# Patient Record
Sex: MALE | Race: WHITE | Employment: FULL TIME | URBAN - METROPOLITAN AREA
[De-identification: names, ages, dates, MRNs, and addresses within clinical notes are randomized per-mention and may not be internally consistent; named-entity substitution may affect disease eponyms.]

---

## 2017-02-09 ENCOUNTER — TELEPHONE (OUTPATIENT)
Dept: CARDIOLOGY CLINIC | Age: 62
End: 2017-02-09

## 2017-02-09 NOTE — TELEPHONE ENCOUNTER
Per patient denied any extensive bruising and confirmed he's been on Plavix for 1 year and he's asking if he needs to continue taking

## 2017-02-10 NOTE — TELEPHONE ENCOUNTER
Can use warm compresses if hematoma or  extensive area of bruising  .  Needs to be confirm with Dr. Vincent Coronado

## 2017-02-21 ENCOUNTER — OFFICE VISIT (OUTPATIENT)
Dept: CARDIOLOGY CLINIC | Age: 62
End: 2017-02-21

## 2017-02-21 VITALS
HEART RATE: 63 BPM | WEIGHT: 220 LBS | DIASTOLIC BLOOD PRESSURE: 81 MMHG | BODY MASS INDEX: 31.5 KG/M2 | SYSTOLIC BLOOD PRESSURE: 146 MMHG | HEIGHT: 70 IN

## 2017-02-21 DIAGNOSIS — I10 ESSENTIAL HYPERTENSION: ICD-10-CM

## 2017-02-21 DIAGNOSIS — E78.5 DYSLIPIDEMIA, GOAL LDL BELOW 70: ICD-10-CM

## 2017-02-21 DIAGNOSIS — I25.10 CORONARY ARTERY DISEASE INVOLVING NATIVE CORONARY ARTERY OF NATIVE HEART WITHOUT ANGINA PECTORIS: Primary | ICD-10-CM

## 2017-02-21 DIAGNOSIS — Z95.5 S/P RIGHT CORONARY ARTERY (RCA) STENT PLACEMENT: ICD-10-CM

## 2017-02-21 RX ORDER — ATORVASTATIN CALCIUM 40 MG/1
40 TABLET, FILM COATED ORAL
Qty: 30 TAB | Refills: 6 | Status: CANCELLED | OUTPATIENT
Start: 2017-02-21

## 2017-02-21 NOTE — MR AVS SNAPSHOT
Visit Information Date & Time Provider Department Dept. Phone Encounter #  
 2/21/2017  9:30 AM Thompson Hilton MD [facility] [place] [id number] Follow-up Instructions Return in about 6 months (around 8/21/2017). Your Appointments 8/24/2017  1:00 PM  
Follow Up with Thompson Hilton MD  
[facility]) Appt Note: 6 month follow up [address] [facility], [address], [address] [place] [address] [place] Upcoming Health Maintenance Date Due Hepatitis C Screening 1955 DTaP/Tdap/Td series (1 - Tdap) 5/19/1978 FOBT Q 1 YEAR AGE 50-75 5/19/2007 INFLUENZA AGE 9 TO ADULT 8/1/2016 Allergies as of 2/21/2017  Review Complete On: 2/21/2017 By: Yousuf Sal Severity Noted Reaction Type Reactions Celebrex [Celecoxib]  06/09/2014    Other (comments) Have hiatal hernia, bleeding concerns Current Immunizations  Never Reviewed No immunizations on file. Not reviewed this visit Vitals BP Pulse Height(growth percentile) Weight(growth percentile) BMI Smoking Status 146/81 63 5' 10\" (1.778 m) 220 lb (99.8 kg) 31.57 kg/m2 Never Smoker Vitals History BMI and BSA Data Body Mass Index Body Surface Area  
 31.57 kg/m 2 2.22 m 2 Preferred Pharmacy [place] [id number] [facility] Your Updated Medication List  
  
   
This list is accurate as of: 2/21/17 10:52 AM.  Always use your most recent med list. amLODIPine 5 mg tablet Commonly known as:  [place] Blade Take 1 Tab by mouth daily. aspirin 81 mg chewable tablet Take 1 Tab by mouth daily. atorvastatin 40 mg tablet Commonly known as:  LIPITOR Take 1 Tab by mouth nightly. metoprolol tartrate 25 mg tablet Commonly known as:  LOPRESSOR  
 Take 1 Tab by mouth every twelve (12) hours. multivitamin tablet Commonly known as:  ONE A DAY Take 1 Tab by mouth daily. NexIUM 40 mg capsule Generic drug:  esomeprazole Take 40 mg by mouth daily. Follow-up Instructions Return in about 6 months (around 8/21/2017). Patient Instructions Learning About Coronary Artery Disease (CAD) What is coronary artery disease? Coronary artery disease (CAD) occurs when plaque builds up in the arteries that bring oxygen-rich blood to your heart. Plaque is a fatty substance made of cholesterol, calcium, and other substances in the blood. This process is called hardening of the arteries, or atherosclerosis. What happens when you have coronary artery disease? · Plaque may narrow the coronary arteries. Narrowed arteries cause poor blood flow. This can lead to angina symptoms such as chest pain or discomfort. If blood flow is completely blocked, you could have a heart attack. · You can slow CAD and reduce the risk of future problems by making changes in your lifestyle. These include quitting smoking and eating heart-healthy foods. · Treatments for CAD, along with changes in your lifestyle, can help you live a longer and healthier life. How can you prevent coronary artery disease? · Do not smoke. It may be the best thing you can do to prevent heart disease. If you need help quitting, talk to your doctor about stop-smoking programs and medicines. These can increase your chances of quitting for good. · Be active. Get at least 30 minutes of exercise on most days of the week. Walking is a good choice. You also may want to do other activities, such as running, swimming, cycling, or playing tennis or team sports. · Eat heart-healthy foods. Eat more fruits and vegetables and less foods that contain saturated and trans fats. Limit alcohol, sodium, and sweets. · Stay at a healthy weight. Lose weight if you need to. · Manage other health problems such as diabetes, high blood pressure, and high cholesterol. · Manage stress. Stress can hurt your heart. To keep stress low, talk about your problems and feelings. Don't keep your feelings hidden. · If you have talked about it with your doctor, take a low-dose aspirin every day. Aspirin can help certain people lower their risk of a heart attack or stroke. But taking aspirin isn't right for everyone, because it can cause serious bleeding. Do not start taking daily aspirin unless your doctor knows about it. How is coronary artery disease treated? · Your doctor will suggest that you make lifestyle changes. For example, your doctor may ask you to eat healthy foods, quit smoking, lose extra weight, and be more active. · You will have to take medicines. · Your doctor may suggest a procedure to open narrowed or blocked arteries. This is called angioplasty. Or your doctor may suggest using healthy blood vessels to create detours around narrowed or blocked arteries. This is called bypass surgery. Follow-up care is a key part of your treatment and safety. Be sure to make and go to all appointments, and call your doctor if you are having problems. It's also a good idea to know your test results and keep a list of the medicines you take. Where can you learn more? Go to http://cain-jagdeep.info/. Enter (35) 6171 8629 in the search box to learn more about \"Learning About Coronary Artery Disease (CAD). \" Current as of: January 27, 2016 Content Version: 11.1 © 6832-7594 [employer]. Care instructions adapted under license by [employer] (which disclaims liability or warranty for this information). If you have questions about a medical condition or this instruction, always ask your healthcare professional. [place] [address] any warranty or liability for your use of this information. Introducing [facility]! Dear Shahida Head: Thank you for requesting a Offerboxx account. Our records indicate that you already have an active Offerboxx account. You can access your account anytime at [email or web address] Did you know that you can access your hospital and ER discharge instructions at any time in Offerboxx? You can also review all of your test results from your hospital stay or ER visit. Additional Information If you have questions, please visit the Frequently Asked Questions section of the Offerboxx website at [email or web address] Remember, Offerboxx is NOT to be used for urgent needs. For medical emergencies, dial 911. Now available from your iPhone and Android! Please provide this summary of care documentation to your next provider. Your primary care clinician is listed as MARIA ELENA NAZARIO. If you have any questions after today's visit, please call [phone].

## 2017-02-21 NOTE — PATIENT INSTRUCTIONS
Learning About Coronary Artery Disease (CAD)  What is coronary artery disease? Coronary artery disease (CAD) occurs when plaque builds up in the arteries that bring oxygen-rich blood to your heart. Plaque is a fatty substance made of cholesterol, calcium, and other substances in the blood. This process is called hardening of the arteries, or atherosclerosis. What happens when you have coronary artery disease? · Plaque may narrow the coronary arteries. Narrowed arteries cause poor blood flow. This can lead to angina symptoms such as chest pain or discomfort. If blood flow is completely blocked, you could have a heart attack. · You can slow CAD and reduce the risk of future problems by making changes in your lifestyle. These include quitting smoking and eating heart-healthy foods. · Treatments for CAD, along with changes in your lifestyle, can help you live a longer and healthier life. How can you prevent coronary artery disease? · Do not smoke. It may be the best thing you can do to prevent heart disease. If you need help quitting, talk to your doctor about stop-smoking programs and medicines. These can increase your chances of quitting for good. · Be active. Get at least 30 minutes of exercise on most days of the week. Walking is a good choice. You also may want to do other activities, such as running, swimming, cycling, or playing tennis or team sports. · Eat heart-healthy foods. Eat more fruits and vegetables and less foods that contain saturated and trans fats. Limit alcohol, sodium, and sweets. · Stay at a healthy weight. Lose weight if you need to. · Manage other health problems such as diabetes, high blood pressure, and high cholesterol. · Manage stress. Stress can hurt your heart. To keep stress low, talk about your problems and feelings. Don't keep your feelings hidden. · If you have talked about it with your doctor, take a low-dose aspirin every day.  Aspirin can help certain people lower their risk of a heart attack or stroke. But taking aspirin isn't right for everyone, because it can cause serious bleeding. Do not start taking daily aspirin unless your doctor knows about it. How is coronary artery disease treated? · Your doctor will suggest that you make lifestyle changes. For example, your doctor may ask you to eat healthy foods, quit smoking, lose extra weight, and be more active. · You will have to take medicines. · Your doctor may suggest a procedure to open narrowed or blocked arteries. This is called angioplasty. Or your doctor may suggest using healthy blood vessels to create detours around narrowed or blocked arteries. This is called bypass surgery. Follow-up care is a key part of your treatment and safety. Be sure to make and go to all appointments, and call your doctor if you are having problems. It's also a good idea to know your test results and keep a list of the medicines you take. Where can you learn more? Go to http://cain-jagdeep.info/. Enter ([phone] 6372 4415 in the search box to learn more about \"Learning About Coronary Artery Disease (CAD). \"  Current as of: January 27, 2016  Content Version: 11.1  © 6174-5527 [employer]. Care instructions adapted under license by [employer] (which disclaims liability or warranty for this information). If you have questions about a medical condition or this instruction, always ask your healthcare professional. [place] [address] any warranty or liability for your use of this information.

## 2017-02-21 NOTE — PROGRESS NOTES
1. Have you been to the ER, urgent care clinic since your last visit? Hospitalized since your last visit? No    2. Have you seen or consulted any other health care providers outside of the [facility] since your last visit? Include any pap smears or colon screening. Yes Where: Dr Vanegas/PCP     3. Since your last visit, have you had any of the following symptoms? chest pains, palpitations, shortness of breath, dizziness and swelling in legs/arms. 4.  Have you had any blood work, X-rays or cardiac testing? No              5.  Where do you normally have your labs drawn? [facility]    6. Do you need any refills today?    No

## 2017-02-27 RX ORDER — ATORVASTATIN CALCIUM 40 MG/1
40 TABLET, FILM COATED ORAL
Qty: 30 TAB | Refills: 6 | Status: SHIPPED | OUTPATIENT
Start: 2017-02-27 | End: 2017-09-07

## 2017-02-28 NOTE — PROGRESS NOTES
HISTORY OF PRESENT ILLNESS  Denisse Rodriguez III is a 63 y.o. male. Shortness of Breath   The history is provided by the patient. This is a chronic problem. The problem occurs rarely. The current episode started more than 1 week ago. The problem has not changed since onset. Pertinent negatives include no fever, no cough, no sputum production, no hemoptysis, no PND, no orthopnea, no vomiting, no leg pain and no claudication. Associated medical issues include CAD and past MI. Associated medical issues do not include heart failure. Chest Pain (Angina)    The history is provided by the patient. This is a new problem. The problem has been resolved. Associated symptoms include shortness of breath. Pertinent negatives include no claudication, no cough, no diaphoresis, no dizziness, no fever, no hemoptysis, no leg pain, no nausea, no near-syncope, no orthopnea, no palpitations, no PND, no sputum production, no vomiting and no weakness. Risk factors include hypertension, male gender and dyslipidemia. His past medical history is significant for HTN. His past medical history does not include DM. Procedural history includes cardiac catheterization, echocardiogram and cardiac stents. Review of Systems   Constitutional: Negative for diaphoresis and fever. Respiratory: Positive for shortness of breath. Negative for cough, hemoptysis and sputum production. Cardiovascular: Negative for palpitations, orthopnea, claudication, PND and near-syncope. Gastrointestinal: Negative for nausea and vomiting. Neurological: Negative for dizziness and weakness.      Family History   Problem Relation Age of Onset    Colon Polyps Mother     Stroke Father     Hypertension Father     Cancer Brother        Past Medical History:   Diagnosis Date    Acid reflux     Failed total hip arthroplasty ([facility] [address] 6/8/2014    GERD (gastroesophageal reflux disease)     Hypertension 2007    Ill-defined condition 12/2015    Echo EF 50%    OA (osteoarthritis)     Osteoarthritis of right hip 6/8/2014    Other ill-defined conditions(799.89)     hiatia hernia    Psychiatric disorder     S/P coronary artery stent placement     STEMI (ST elevation myocardial infarction) ([facility] [address] 12/02/2015    RCA stent       Past Surgical History:   Procedure Laterality Date    HX HIP REPLACEMENT      left hip replacement    HX KNEE ARTHROSCOPY  6-04-12    right knee replacemtnt    HX OTHER SURGICAL      bilateral hip replacement    HX TONSILLECTOMY         Social History   Substance Use Topics    Smoking status: Never Smoker    Smokeless tobacco: Never Used    Alcohol use 0.0 oz/week     6 - 12 Cans of beer per week       Allergies   Allergen Reactions    Celebrex [Celecoxib] Other (comments)     Have hiatal hernia, bleeding concerns       Outpatient Prescriptions Marked as Taking for the 2/21/17 encounter (Office Visit) with Agustín Perez MD   Medication Sig Dispense Refill    amLODIPine (NORVASC) 5 mg tablet Take 1 Tab by mouth daily. 30 Tab 6    [DISCONTINUED] atorvastatin (LIPITOR) 40 mg tablet Take 1 Tab by mouth nightly. 30 Tab 6    metoprolol tartrate (LOPRESSOR) 25 mg tablet Take 1 Tab by mouth every twelve (12) hours. 180 Tab 3    aspirin 81 mg chewable tablet Take 1 Tab by mouth daily. 30 Tab 6    multivitamin (ONE A DAY) tablet Take 1 Tab by mouth daily.  esomeprazole (NEXIUM) 40 mg capsule Take 40 mg by mouth daily. Visit Vitals    /81    Pulse 63    Ht 5' 10\" (1.778 m)    Wt 99.8 kg (220 lb)    BMI 31.57 kg/m2     Physical Exam   Constitutional: He is oriented to person, place, and time. He appears well-developed and well-nourished. No distress. HENT:   Head: Atraumatic. Mouth/Throat: No oropharyngeal exudate. Eyes: Conjunctivae are normal. No scleral icterus. Neck: Neck supple. No JVD present. Cardiovascular: Normal rate and regular rhythm. Exam reveals no gallop. No murmur heard.   Pulmonary/Chest: Effort normal and breath sounds normal. No stridor. He has no wheezes. He has no rales. Abdominal: Soft. There is no tenderness. There is no rebound. Musculoskeletal: Normal range of motion. He exhibits no edema or tenderness. Neurological: He is alert and oriented to person, place, and time. He exhibits normal muscle tone. Skin: Skin is warm. He is not diaphoretic. Psychiatric: He has a normal mood and affect. His behavior is normal.       ASSESSMENT and PLAN    ICD-10-CM ICD-9-CM    1. Coronary artery disease involving native coronary artery of native heart without angina pectoris I25.10 414.01    2. Essential hypertension I10 401.9    3. Dyslipidemia, goal LDL below 70 E78.5 272.4    4. S/P right coronary artery (RCA) stent placement Z95.5 V45.82      No orders of the defined types were placed in this encounter. Follow-up Disposition:  Return in about 6 months (around 8/21/2017). current treatment plan is effective, no change in therapy  cardiovascular risk and specific lipid/LDL goals reviewed  use of aspirin to prevent MI and TIA's discussed. Patient with recent inferior wall MI- s/p RCA stent- also has residual moderate to severe LAd stenosis- stress test done failed to reveal any ischemia. plavix discontinued due to extensive brusing  Continue intense risk factor modification. Continue intense risk factor modification.

## 2017-05-24 RX ORDER — AMLODIPINE BESYLATE 5 MG/1
5 TABLET ORAL DAILY
Qty: 30 TAB | Refills: 6 | Status: SHIPPED | OUTPATIENT
Start: 2017-05-24 | End: 2018-02-04 | Stop reason: SDUPTHER

## 2017-08-15 ENCOUNTER — OFFICE VISIT (OUTPATIENT)
Dept: CARDIOLOGY CLINIC | Age: 62
End: 2017-08-15

## 2017-08-15 VITALS
HEIGHT: 70 IN | DIASTOLIC BLOOD PRESSURE: 88 MMHG | WEIGHT: 226 LBS | SYSTOLIC BLOOD PRESSURE: 140 MMHG | BODY MASS INDEX: 32.35 KG/M2 | HEART RATE: 83 BPM

## 2017-08-15 DIAGNOSIS — I25.10 CORONARY ARTERY DISEASE INVOLVING NATIVE CORONARY ARTERY OF NATIVE HEART WITHOUT ANGINA PECTORIS: Primary | ICD-10-CM

## 2017-08-15 DIAGNOSIS — K44.9 PARAESOPHAGEAL HERNIA: ICD-10-CM

## 2017-08-15 DIAGNOSIS — Z95.5 S/P RIGHT CORONARY ARTERY (RCA) STENT PLACEMENT: ICD-10-CM

## 2017-08-15 DIAGNOSIS — E78.5 DYSLIPIDEMIA, GOAL LDL BELOW 70: ICD-10-CM

## 2017-08-15 DIAGNOSIS — R07.9 CHEST PAIN, UNSPECIFIED TYPE: ICD-10-CM

## 2017-08-15 DIAGNOSIS — I10 ESSENTIAL HYPERTENSION: ICD-10-CM

## 2017-08-15 RX ORDER — ROSUVASTATIN CALCIUM 20 MG/1
40 TABLET, COATED ORAL
COMMUNITY

## 2017-08-15 RX ORDER — METOPROLOL TARTRATE 25 MG/1
25 TABLET, FILM COATED ORAL EVERY 12 HOURS
Qty: 180 TAB | Refills: 3 | Status: CANCELLED | OUTPATIENT
Start: 2017-08-15

## 2017-08-15 NOTE — PROGRESS NOTES
HISTORY OF PRESENT ILLNESS  Alexander Manzanares III is a 63 y.o. male. Shortness of Breath   The history is provided by the patient. This is a chronic problem. The problem occurs rarely. The current episode started more than 1 week ago. The problem has not changed since onset. Pertinent negatives include no fever, no cough, no sputum production, no hemoptysis, no PND, no orthopnea, no vomiting, no leg pain and no claudication. Associated medical issues include CAD and past MI. Associated medical issues do not include heart failure. Chest Pain (Angina)    The history is provided by the patient. This is a new problem. The problem has been resolved. Pertinent negatives include no claudication, no cough, no diaphoresis, no dizziness, no fever, no hemoptysis, no leg pain, no nausea, no near-syncope, no orthopnea, no palpitations, no PND, no sputum production, no vomiting and no weakness. Risk factors include hypertension, male gender and dyslipidemia. His past medical history is significant for HTN. His past medical history does not include DM. Procedural history includes cardiac catheterization, echocardiogram and cardiac stents. Review of Systems   Constitutional: Negative for diaphoresis and fever. Respiratory: Negative for cough, hemoptysis and sputum production. Cardiovascular: Negative for palpitations, orthopnea, claudication, PND and near-syncope. Gastrointestinal: Negative for nausea and vomiting. Neurological: Negative for dizziness and weakness.      Family History   Problem Relation Age of Onset    Colon Polyps Mother     Stroke Father     Hypertension Father     Cancer Brother        Past Medical History:   Diagnosis Date    Acid reflux     Failed total hip arthroplasty ([facility] [address] 6/8/2014    GERD (gastroesophageal reflux disease)     Hypertension 2007    Ill-defined condition 12/2015    Echo EF 50%    OA (osteoarthritis)     Osteoarthritis of right hip 6/8/2014    Other ill-defined conditions     hiatia hernia    Psychiatric disorder     S/P coronary artery stent placement     STEMI (ST elevation myocardial infarction) ([facility] [address] 12/02/2015    RCA stent       Past Surgical History:   Procedure Laterality Date    HX HIP REPLACEMENT      left hip replacement    HX KNEE ARTHROSCOPY  6-04-12    right knee replacemtnt    HX OTHER SURGICAL      bilateral hip replacement    HX TONSILLECTOMY         Social History   Substance Use Topics    Smoking status: Never Smoker    Smokeless tobacco: Never Used    Alcohol use 0.0 oz/week     6 - 12 Cans of beer per week       Allergies   Allergen Reactions    Celebrex [Celecoxib] Other (comments)     Have hiatal hernia, bleeding concerns       Outpatient Prescriptions Marked as Taking for the 8/15/17 encounter (Office Visit) with Carlos Recio MD   Medication Sig Dispense Refill    rosuvastatin (CRESTOR) 20 mg tablet Take 20 mg by mouth nightly.  amLODIPine (NORVASC) 5 mg tablet Take 1 Tab by mouth daily. 30 Tab 6    metoprolol tartrate (LOPRESSOR) 25 mg tablet Take 1 Tab by mouth every twelve (12) hours. 180 Tab 3    aspirin 81 mg chewable tablet Take 1 Tab by mouth daily. 30 Tab 6        Visit Vitals    /88    Pulse 83    Ht 5' 10\" (1.778 m)    Wt 102.5 kg (226 lb)    BMI 32.43 kg/m2     Physical Exam   Constitutional: He is oriented to person, place, and time. He appears well-developed and well-nourished. No distress. HENT:   Head: Atraumatic. Mouth/Throat: No oropharyngeal exudate. Eyes: Conjunctivae are normal. No scleral icterus. Neck: Neck supple. No JVD present. Cardiovascular: Normal rate and regular rhythm. Exam reveals no gallop. No murmur heard. Pulmonary/Chest: Effort normal and breath sounds normal. No stridor. He has no wheezes. He has no rales. Abdominal: Soft. There is no tenderness. There is no rebound. Musculoskeletal: Normal range of motion. He exhibits no edema or tenderness. Neurological: He is alert and oriented to person, place, and time. He exhibits normal muscle tone. Skin: Skin is warm. He is not diaphoretic. Psychiatric: He has a normal mood and affect. His behavior is normal.       ASSESSMENT and PLAN    ICD-10-CM ICD-9-CM    1. Coronary artery disease involving native coronary artery of native heart without angina pectoris I25.10 414.01    2. Essential hypertension I10 401.9    3. Chest pain, unspecified type R07.9 786.50 AMB POC EKG ROUTINE W/ 12 LEADS, INTER & REP   4. Dyslipidemia, goal LDL below 70 E78.5 272.4    5. S/P right coronary artery (RCA) stent placement Z95.5 V45.82    6. Paraesophageal hernia K44.9 553.3      Orders Placed This Encounter    AMB POC EKG ROUTINE W/ 12 LEADS, INTER & REP     Order Specific Question:   Reason for Exam:     Answer:   surgicial clearance     Follow-up Disposition:  Return in about 6 months (around 2/15/2018). current treatment plan is effective, no change in therapy  cardiovascular risk and specific lipid/LDL goals reviewed  use of aspirin to prevent MI and TIA's discussed. Patient with recent inferior wall MI- s/p RCA stent- also has residual moderate to severe LAd stenosis- stress test done failed to reveal any ischemia. plavix discontinued due to extensive brusing  Seen for pre op evaluation prior to paraesophageal hernia surgery. His ET is good and lipid well controlled. Can proceed to planned surgery with low to intermediate cardiac risk. If needed, okay to hold aspirin for 3-5 days prior to surgery and resume post op ASAP.

## 2017-08-15 NOTE — MR AVS SNAPSHOT
Visit Information Date & Time Provider Department Dept. Phone Encounter #  
 8/15/2017 12:45 PM Leslie Smith MD [facility] [address] [place] [address] [id number] Follow-up Instructions Return in about 6 months (around 2/15/2018). Your Appointments 2/20/2018 12:15 PM  
Office Visit with Leslie Smith MD  
[facility]) Appt Note:   
 [address] [facility], [address] Upcoming Health Maintenance Date Due Hepatitis C Screening 1955 DTaP/Tdap/Td series (1 - Tdap) 5/19/1978 FOBT Q 1 YEAR AGE 50-75 5/19/2007 ZOSTER VACCINE AGE 60> 3/19/2017 INFLUENZA AGE 9 TO ADULT 8/1/2017 Allergies as of 8/15/2017  Review Complete On: 8/15/2017 By: Cosme Arguello LPN Severity Noted Reaction Type Reactions Celebrex [Celecoxib]  06/09/2014    Other (comments) Have hiatal hernia, bleeding concerns Current Immunizations  Never Reviewed No immunizations on file. Not reviewed this visit You Were Diagnosed With   
  
 Codes Comments Coronary artery disease involving native coronary artery of native heart without angina pectoris    -  Primary ICD-10-CM: I25.10 ICD-9-CM: 414.01 Essential hypertension     ICD-10-CM: I10 
ICD-9-CM: 401.9 Chest pain, unspecified type     ICD-10-CM: R07.9 ICD-9-CM: 786.50 Dyslipidemia, goal LDL below 70     ICD-10-CM: E78.5 ICD-9-CM: 272.4 S/P right coronary artery (RCA) stent placement     ICD-10-CM: Z95.5 ICD-9-CM: V45.82 Paraesophageal hernia     ICD-10-CM: K44.9 ICD-9-CM: 755. 3 Vitals BP Pulse Height(growth percentile) Weight(growth percentile) BMI Smoking Status 140/88 83 5' 10\" (1.778 m) 226 lb (102.5 kg) 32.43 kg/m2 Never Smoker Vitals History BMI and BSA Data Body Mass Index Body Surface Area 32.43 kg/m 2 2.25 m 2 Preferred Pharmacy Pharmacy Name Phone [facility] Your Updated Medication List  
  
   
This list is accurate as of: 8/15/17  1:32 PM.  Always use your most recent med list. amLODIPine 5 mg tablet Commonly known as:  Patrick Bailey Take 1 Tab by mouth daily. aspirin 81 mg chewable tablet Take 1 Tab by mouth daily. atorvastatin 40 mg tablet Commonly known as:  LIPITOR Take 1 Tab by mouth nightly. CRESTOR 20 mg tablet Generic drug:  rosuvastatin Take 20 mg by mouth nightly. metoprolol tartrate 25 mg tablet Commonly known as:  LOPRESSOR Take 1 Tab by mouth every twelve (12) hours. multivitamin tablet Commonly known as:  ONE A DAY Take 1 Tab by mouth daily. NexIUM 40 mg capsule Generic drug:  esomeprazole Take 40 mg by mouth daily. We Performed the Following AMB POC EKG ROUTINE W/ 12 LEADS, INTER & REP [05555 CPT(R)] Follow-up Instructions Return in about 6 months (around 2/15/2018). To-Do List   
 09/08/2017 7:00 AM  
  Appointment with [address] at [address] [place] [address] ([phone]) Introducing [facility]! Dear Antoni Acevedo: 
Thank you for requesting a [employer] account. Our records indicate that you already have an active [employer] account. You can access your account anytime at [email or web address] Did you know that you can access your hospital and ER discharge instructions at any time in [employer]? You can also review all of your test results from your hospital stay or ER visit. Additional Information If you have questions, please visit the Frequently Asked Questions section of the [employer] website at [email or web address] Remember, [employer] is NOT to be used for urgent needs. For medical emergencies, dial 911. Now available from your iPhone and Android! Please provide this summary of care documentation to your next provider. Your primary care clinician is listed as MARIA ELENA NAZARIO. If you have any questions after today's visit, please call [phone].

## 2017-08-15 NOTE — LETTER
8/15/2017 Dr Cronin Cargo [address] [address] Dear Samira Broderick: 
 
Re: Farzana Orellana  
: 1955 Mr. Yue Luevano is cleared from a cardiac standpoint with mild risk for Hernia surgery scheduled on 17. If you have any questions or any further assistance is needed please contact our office.  
 
Sincerely, 
 
 
 
 
 
Simon Serna MD

## 2017-08-15 NOTE — PROGRESS NOTES
1. Have you been to the ER, urgent care clinic since your last visit? Hospitalized since your last visit?    no    2. Have you seen or consulted any other health care providers outside of the [address] [place] [address] since your last visit? Include any pap smears or colon screening. Yes,pcp    3. Since your last visit, have you had any of the following symptoms? Little chest pain and sob d/t hernia, little swelling          4. Have you had any blood work, X-rays or cardiac testing? Yes, [facility] in [facility]            5.  Where do you normally have your labs drawn? [facility] in [facility]    6. Do you need any refills today?

## 2017-09-08 PROBLEM — K44.9 HIATAL HERNIA: Status: ACTIVE | Noted: 2017-09-08

## 2017-10-04 RX ORDER — METOPROLOL TARTRATE 25 MG/1
TABLET, FILM COATED ORAL
Qty: 180 TAB | Refills: 0 | Status: SHIPPED | OUTPATIENT
Start: 2017-10-04 | End: 2018-04-04 | Stop reason: SDUPTHER

## 2018-04-04 RX ORDER — METOPROLOL TARTRATE 25 MG/1
TABLET, FILM COATED ORAL
Qty: 180 TAB | Refills: 0 | Status: SHIPPED | OUTPATIENT
Start: 2018-04-04 | End: 2018-08-20 | Stop reason: SDUPTHER

## 2018-05-15 LAB
LDL-C, EXTERNAL: 76
LDL-C, EXTERNAL: 76

## 2018-06-06 ENCOUNTER — OFFICE VISIT (OUTPATIENT)
Dept: CARDIOLOGY CLINIC | Age: 63
End: 2018-06-06

## 2018-06-06 VITALS
WEIGHT: 228 LBS | BODY MASS INDEX: 33.77 KG/M2 | DIASTOLIC BLOOD PRESSURE: 90 MMHG | HEIGHT: 69 IN | SYSTOLIC BLOOD PRESSURE: 151 MMHG | HEART RATE: 65 BPM

## 2018-06-06 DIAGNOSIS — E78.5 DYSLIPIDEMIA, GOAL LDL BELOW 70: ICD-10-CM

## 2018-06-06 DIAGNOSIS — I10 ESSENTIAL HYPERTENSION: ICD-10-CM

## 2018-06-06 DIAGNOSIS — I25.118 CORONARY ARTERY DISEASE OF NATIVE ARTERY OF NATIVE HEART WITH STABLE ANGINA PECTORIS (HCC): Primary | ICD-10-CM

## 2018-06-06 DIAGNOSIS — Z95.5 S/P RIGHT CORONARY ARTERY (RCA) STENT PLACEMENT: ICD-10-CM

## 2018-06-06 RX ORDER — TAMSULOSIN HYDROCHLORIDE 0.4 MG/1
0.4 CAPSULE ORAL DAILY
COMMUNITY

## 2018-06-06 RX ORDER — ISOSORBIDE MONONITRATE 30 MG/1
30 TABLET, EXTENDED RELEASE ORAL
Qty: 30 TAB | Refills: 4 | Status: SHIPPED | OUTPATIENT
Start: 2018-06-06 | End: 2022-08-24

## 2018-06-06 RX ORDER — AMOXICILLIN 875 MG/1
875 TABLET, FILM COATED ORAL 2 TIMES DAILY
COMMUNITY
End: 2018-08-03 | Stop reason: ALTCHOICE

## 2018-06-06 NOTE — MR AVS SNAPSHOT
[address] [facility] [place] [address] 706 [facility] 
[phone] Patient: Jackson Urrutia 
MRN: [id number] Visit Information Date & Time Provider Department Dept. Phone Encounter #  
 6/6/2018 11:45 AM Kannan Lassiter MD [facility] [place]  Follow-up Instructions Return in about 2 months (around 8/6/2018). Your Appointments 8/7/2018  9:30 AM  
Office Visit with Kannan Lassiter MD  
[facility]) Appt Note: 2 month follow up [address] [facility], [address] [facility] [address] 4058 [facility], [address] [place] [address] Upcoming Health Maintenance Date Due Hepatitis C Screening 1955 DTaP/Tdap/Td series (1 - Tdap) 5/19/1978 FOBT Q 1 YEAR AGE 50-75 5/19/2007 ZOSTER VACCINE AGE 60> 3/19/2017 Influenza Age 5 to Adult 8/1/2018 Allergies as of 6/6/2018  Review Complete On: 9/8/2017 By: Jeane Harden RN Severity Noted Reaction Type Reactions Celebrex [Celecoxib]  06/09/2014    Other (comments) Have hiatal hernia, bleeding concerns Current Immunizations  Never Reviewed No immunizations on file. Not reviewed this visit Vitals BP Pulse Height(growth percentile) Weight(growth percentile) BMI Smoking Status 151/90 65 5' 9\" (1.753 m) 228 lb (103.4 kg) 33.67 kg/m2 Never Smoker Vitals History BMI and BSA Data Body Mass Index Body Surface Area  
 33.67 kg/m 2 2.24 m 2 Preferred Pharmacy Pharmacy Name Phone [address], [facility] [address] [facility] Your Updated Medication List  
  
   
This list is accurate as of 6/6/18 12:24 PM.  Always use your most recent med list. amLODIPine 5 mg tablet Commonly known as:  Georgette Payne TAKE ONE TABLET BY MOUTH DAILY amoxicillin 875 mg tablet Commonly known as:  AMOXIL Take 875 mg by mouth two (2) times a day. aspirin 81 mg chewable tablet Take 1 Tab by mouth daily. CRESTOR 20 mg tablet Generic drug:  rosuvastatin Take 20 mg by mouth nightly. GINKGO BILOBA PO Take  by mouth.  
  
 isosorbide mononitrate ER 30 mg tablet Commonly known as:  IMDUR Take 1 Tab by mouth every morning. metoprolol tartrate 25 mg tablet Commonly known as:  LOPRESSOR  
TAKE 1 TABLET BY MOUTH EVERY 12 HOURS  
  
 multivitamin tablet Commonly known as:  ONE A DAY Take 1 Tab by mouth daily. NexIUM 40 mg capsule Generic drug:  esomeprazole Take 40 mg by mouth as needed. tamsulosin 0.4 mg capsule Commonly known as:  FLOMAX Take 0.4 mg by mouth daily. Prescriptions Sent to Pharmacy Refills  
 isosorbide mononitrate ER (IMDUR) 30 mg tablet 4 Sig: Take 1 Tab by mouth every morning. Class: Normal  
 Pharmacy: [address] #: [phone] Route: Oral  
  
Follow-up Instructions Return in about 2 months (around 8/6/2018). Patient Instructions High Blood Pressure: Care Instructions Your Care Instructions If your blood pressure is usually above 140/90, you have high blood pressure, or hypertension. That means the top number is 140 or higher or the bottom number is 90 or higher, or both. Despite what a lot of people think, high blood pressure usually doesn't cause headaches or make you feel dizzy or lightheaded. It usually has no symptoms. But it does increase your risk for heart attack, stroke, and kidney or eye damage. The higher your blood pressure, the more your risk increases. Your doctor will give you a goal for your blood pressure. Your goal will be based on your health and your age. An example of a goal is to keep your blood pressure below 140/90. Lifestyle changes, such as eating healthy and being active, are always important to help lower blood pressure. You might also take medicine to reach your blood pressure goal. 
Follow-up care is a key part of your treatment and safety. Be sure to make and go to all appointments, and call your doctor if you are having problems. It's also a good idea to know your test results and keep a list of the medicines you take. How can you care for yourself at home? Medical treatment · If you stop taking your medicine, your blood pressure will go back up. You may take one or more types of medicine to lower your blood pressure. Be safe with medicines. Take your medicine exactly as prescribed. Call your doctor if you think you are having a problem with your medicine. · Talk to your doctor before you start taking aspirin every day. Aspirin can help certain people lower their risk of a heart attack or stroke. But taking aspirin isn't right for everyone, because it can cause serious bleeding. · See your doctor regularly. You may need to see the doctor more often at first or until your blood pressure comes down. · If you are taking blood pressure medicine, talk to your doctor before you take decongestants or anti-inflammatory medicine, such as ibuprofen. Some of these medicines can raise blood pressure. · Learn how to check your blood pressure at home. Lifestyle changes · Stay at a healthy weight. This is especially important if you put on weight around the waist. Losing even 10 pounds can help you lower your blood pressure. · If your doctor recommends it, get more exercise. Walking is a good choice. Bit by bit, increase the amount you walk every day. Try for at least 30 minutes on most days of the week. You also may want to swim, bike, or do other activities. · Avoid or limit alcohol. Talk to your doctor about whether you can drink any alcohol.  
· Try to limit how much sodium you eat to less than 2,300 milligrams (mg) a day. Your doctor may ask you to try to eat less than 1,500 mg a day. · Eat plenty of fruits (such as bananas and oranges), vegetables, legumes, whole grains, and low-fat dairy products. · Lower the amount of saturated fat in your diet. Saturated fat is found in animal products such as milk, cheese, and meat. Limiting these foods may help you lose weight and also lower your risk for heart disease. · Do not smoke. Smoking increases your risk for heart attack and stroke. If you need help quitting, talk to your doctor about stop-smoking programs and medicines. These can increase your chances of quitting for good. When should you call for help? Call 911 anytime you think you may need emergency care. This may mean having symptoms that suggest that your blood pressure is causing a serious heart or blood vessel problem. Your blood pressure may be over 180/110. ? For example, call 911 if: 
? · You have symptoms of a heart attack. These may include: ¨ Chest pain or pressure, or a strange feeling in the chest. 
¨ Sweating. ¨ Shortness of breath. ¨ Nausea or vomiting. ¨ Pain, pressure, or a strange feeling in the back, neck, jaw, or upper belly or in one or both shoulders or arms. ¨ Lightheadedness or sudden weakness. ¨ A fast or irregular heartbeat. ? · You have symptoms of a stroke. These may include: 
¨ Sudden numbness, tingling, weakness, or loss of movement in your face, arm, or leg, especially on only one side of your body. ¨ Sudden vision changes. ¨ Sudden trouble speaking. ¨ Sudden confusion or trouble understanding simple statements. ¨ Sudden problems with walking or balance. ¨ A sudden, severe headache that is different from past headaches. ? · You have severe back or belly pain. ?Do not wait until your blood pressure comes down on its own. Get help right away. ?Call your doctor now or seek immediate care if: 
? · Your blood pressure is much higher than normal (such as 180/110 or higher), but you don't have symptoms. ? · You think high blood pressure is causing symptoms, such as: ¨ Severe headache. ¨ Blurry vision. ? Watch closely for changes in your health, and be sure to contact your doctor if: 
? · Your blood pressure measures 140/90 or higher at least 2 times. That means the top number is 140 or higher or the bottom number is 90 or higher, or both. ? · You think you may be having side effects from your blood pressure medicine. ? · Your blood pressure is usually normal, but it goes above normal at least 2 times. Where can you learn more? Go to http://cain-jagdeep.info/. Enter S328 in the search box to learn more about \"High Blood Pressure: Care Instructions. \" Current as of: September 21, 2016 Content Version: 11.4 © 3323-5775 [employer]. Care instructions adapted under license by [employer] (which disclaims liability or warranty for this information). If you have questions about a medical condition or this instruction, always ask your healthcare professional. [place] [address] any warranty or liability for your use of this information. Introducing [facility]! Dear Luzma Jones: 
Thank you for requesting a [employer] account. Our records indicate that you already have an active [employer] account. You can access your account anytime at [email or web address] Did you know that you can access your hospital and ER discharge instructions at any time in [employer]? You can also review all of your test results from your hospital stay or ER visit. Additional Information If you have questions, please visit the Frequently Asked Questions section of the [employer] website at [email or web address] Remember, [employer] is NOT to be used for urgent needs. For medical emergencies, dial 911. Now available from your iPhone and Android! Please provide this summary of care documentation to your next provider. Your primary care clinician is listed as MARIA ELENA NAZARIO. If you have any questions after today's visit, please call [phone].

## 2018-06-06 NOTE — PROGRESS NOTES
1. Have you been to the ER, urgent care clinic since your last visit? Hospitalized since your last visit?     no    2. Have you seen or consulted any other health care providers outside of the [facility] since your last visit? Include any pap smears or colon screening. Yes Where: pcp     3. Since your last visit, have you had any of the following symptoms? chest pains and shortness of breath.

## 2018-06-06 NOTE — PATIENT INSTRUCTIONS
High Blood Pressure: Care Instructions  Your Care Instructions    If your blood pressure is usually above 140/90, you have high blood pressure, or hypertension. That means the top number is 140 or higher or the bottom number is 90 or higher, or both. Despite what a lot of people think, high blood pressure usually doesn't cause headaches or make you feel dizzy or lightheaded. It usually has no symptoms. But it does increase your risk for heart attack, stroke, and kidney or eye damage. The higher your blood pressure, the more your risk increases. Your doctor will give you a goal for your blood pressure. Your goal will be based on your health and your age. An example of a goal is to keep your blood pressure below 140/90. Lifestyle changes, such as eating healthy and being active, are always important to help lower blood pressure. You might also take medicine to reach your blood pressure goal.  Follow-up care is a key part of your treatment and safety. Be sure to make and go to all appointments, and call your doctor if you are having problems. It's also a good idea to know your test results and keep a list of the medicines you take. How can you care for yourself at home? Medical treatment  · If you stop taking your medicine, your blood pressure will go back up. You may take one or more types of medicine to lower your blood pressure. Be safe with medicines. Take your medicine exactly as prescribed. Call your doctor if you think you are having a problem with your medicine. · Talk to your doctor before you start taking aspirin every day. Aspirin can help certain people lower their risk of a heart attack or stroke. But taking aspirin isn't right for everyone, because it can cause serious bleeding. · See your doctor regularly. You may need to see the doctor more often at first or until your blood pressure comes down.   · If you are taking blood pressure medicine, talk to your doctor before you take decongestants or anti-inflammatory medicine, such as ibuprofen. Some of these medicines can raise blood pressure. · Learn how to check your blood pressure at home. Lifestyle changes  · Stay at a healthy weight. This is especially important if you put on weight around the waist. Losing even 10 pounds can help you lower your blood pressure. · If your doctor recommends it, get more exercise. Walking is a good choice. Bit by bit, increase the amount you walk every day. Try for at least 30 minutes on most days of the week. You also may want to swim, bike, or do other activities. · Avoid or limit alcohol. Talk to your doctor about whether you can drink any alcohol. · Try to limit how much sodium you eat to less than 2,300 milligrams (mg) a day. Your doctor may ask you to try to eat less than 1,500 mg a day. · Eat plenty of fruits (such as bananas and oranges), vegetables, legumes, whole grains, and low-fat dairy products. · Lower the amount of saturated fat in your diet. Saturated fat is found in animal products such as milk, cheese, and meat. Limiting these foods may help you lose weight and also lower your risk for heart disease. · Do not smoke. Smoking increases your risk for heart attack and stroke. If you need help quitting, talk to your doctor about stop-smoking programs and medicines. These can increase your chances of quitting for good. When should you call for help? Call 911 anytime you think you may need emergency care. This may mean having symptoms that suggest that your blood pressure is causing a serious heart or blood vessel problem. Your blood pressure may be over 180/110. ? For example, call 911 if:  ? · You have symptoms of a heart attack. These may include:  ¨ Chest pain or pressure, or a strange feeling in the chest.  ¨ Sweating. ¨ Shortness of breath. ¨ Nausea or vomiting.   ¨ Pain, pressure, or a strange feeling in the back, neck, jaw, or upper belly or in one or both shoulders or arms.  ¨ Lightheadedness or sudden weakness. ¨ A fast or irregular heartbeat. ? · You have symptoms of a stroke. These may include:  ¨ Sudden numbness, tingling, weakness, or loss of movement in your face, arm, or leg, especially on only one side of your body. ¨ Sudden vision changes. ¨ Sudden trouble speaking. ¨ Sudden confusion or trouble understanding simple statements. ¨ Sudden problems with walking or balance. ¨ A sudden, severe headache that is different from past headaches. ? · You have severe back or belly pain. ?Do not wait until your blood pressure comes down on its own. Get help right away. ?Call your doctor now or seek immediate care if:  ? · Your blood pressure is much higher than normal (such as 180/110 or higher), but you don't have symptoms. ? · You think high blood pressure is causing symptoms, such as:  ¨ Severe headache. ¨ Blurry vision. ? Watch closely for changes in your health, and be sure to contact your doctor if:  ? · Your blood pressure measures 140/90 or higher at least 2 times. That means the top number is 140 or higher or the bottom number is 90 or higher, or both. ? · You think you may be having side effects from your blood pressure medicine. ? · Your blood pressure is usually normal, but it goes above normal at least 2 times. Where can you learn more? Go to http://cain-jagdeep.info/. Enter M341 in the search box to learn more about \"High Blood Pressure: Care Instructions. \"  Current as of: September 21, 2016  Content Version: 11.4  © 2143-0438 [employer]. Care instructions adapted under license by [employer] (which disclaims liability or warranty for this information). If you have questions about a medical condition or this instruction, always ask your healthcare professional. [place] [address] any warranty or liability for your use of this information.

## 2018-06-06 NOTE — LETTER
2018 1:17 PM 
 
Mr. Grant Mares  
[address] [place] [address] Dear Dr. Tiesha Man: 
 
Re: Sang Pastrana  
: 1955 Mr. Zion Banegas is cleared from a cardiac standpoint with low risk for colonoscopy. If you have any questions or any further assistance is needed please contact our office. Sincerely, Signed By: Sofi Koenig MD  
 2018 Sofi Koenig MD 
 
cc:  Vanessa Hernandez MD

## 2018-06-06 NOTE — PROGRESS NOTES
HISTORY OF PRESENT ILLNESS  Giuliano Anderson III is a 66 y.o. male. Shortness of Breath   The history is provided by the patient. This is a chronic problem. The problem occurs rarely. The current episode started more than 1 week ago. The problem has not changed since onset. Associated symptoms include chest pain. Pertinent negatives include no fever, no cough, no sputum production, no hemoptysis, no PND, no orthopnea, no vomiting, no leg pain and no claudication. Associated medical issues include CAD and past MI. Associated medical issues do not include heart failure. Chest Pain (Angina)    The history is provided by the patient. This is a new problem. The problem occurs every several days. Pertinent negatives include no claudication, no cough, no diaphoresis, no dizziness, no fever, no hemoptysis, no leg pain, no nausea, no near-syncope, no orthopnea, no palpitations, no PND, no sputum production, no vomiting and no weakness. Risk factors include hypertension, male gender and dyslipidemia. His past medical history is significant for HTN. His past medical history does not include DM. Procedural history includes cardiac catheterization, echocardiogram and cardiac stents. Review of Systems   Constitutional: Negative for diaphoresis and fever. Respiratory: Negative for cough, hemoptysis and sputum production. Cardiovascular: Positive for chest pain. Negative for palpitations, orthopnea, claudication, PND and near-syncope. Gastrointestinal: Negative for nausea and vomiting. Neurological: Negative for dizziness and weakness.      Family History   Problem Relation Age of Onset    Colon Polyps Mother     Stroke Father     Hypertension Father     Cancer Brother        Past Medical History:   Diagnosis Date    Acid reflux     Failed total hip arthroplasty ([facility] [address] 6/8/2014    GERD (gastroesophageal reflux disease)     Hypertension 2007    Ill-defined condition 12/2015    Echo EF 50%    OA (osteoarthritis)  Osteoarthritis of right hip 6/8/2014    Other ill-defined conditions(799.89)     hiatia hernia    Psychiatric disorder     S/P coronary artery stent placement     STEMI (ST elevation myocardial infarction) ([facility] [address] 12/02/2015    RCA stent       Past Surgical History:   Procedure Laterality Date    HX CORONARY STENT PLACEMENT Right 2015    right coronary     HX HIP REPLACEMENT      left hip replacement about 18 yrs. ago.  HX KNEE REPLACEMENT  6-04-12    right knee replacemtnt    HX OTHER SURGICAL      bilateral hip replacement    HX TONSILLECTOMY         Social History   Substance Use Topics    Smoking status: Never Smoker    Smokeless tobacco: Never Used    Alcohol use 0.0 oz/week     6 - 12 Cans of beer per week       Allergies   Allergen Reactions    Celebrex [Celecoxib] Other (comments)     Have hiatal hernia, bleeding concerns       Outpatient Prescriptions Marked as Taking for the 6/6/18 encounter (Office Visit) with Leslie Smith MD   Medication Sig Dispense Refill    tamsulosin (FLOMAX) 0.4 mg capsule Take 0.4 mg by mouth daily.  GINKGO BILOBA PO Take  by mouth.  amoxicillin (AMOXIL) 875 mg tablet Take 875 mg by mouth two (2) times a day.  isosorbide mononitrate ER (IMDUR) 30 mg tablet Take 1 Tab by mouth every morning. 30 Tab 4    metoprolol tartrate (LOPRESSOR) 25 mg tablet TAKE 1 TABLET BY MOUTH EVERY 12 HOURS 180 Tab 0    amLODIPine (NORVASC) 5 mg tablet TAKE ONE TABLET BY MOUTH DAILY 30 Tab 3    rosuvastatin (CRESTOR) 20 mg tablet Take 20 mg by mouth nightly.  aspirin 81 mg chewable tablet Take 1 Tab by mouth daily. 30 Tab 6    multivitamin (ONE A DAY) tablet Take 1 Tab by mouth daily.  esomeprazole (NEXIUM) 40 mg capsule Take 40 mg by mouth as needed. Visit Vitals    /90    Pulse 65    Ht 5' 9\" (1.753 m)    Wt 103.4 kg (228 lb)    BMI 33.67 kg/m2     Physical Exam   Constitutional: He is oriented to person, place, and time.  He appears well-developed and well-nourished. No distress. HENT:   Head: Atraumatic. Mouth/Throat: No oropharyngeal exudate. Eyes: Conjunctivae are normal. No scleral icterus. Neck: Neck supple. No JVD present. Cardiovascular: Normal rate and regular rhythm. Exam reveals no gallop. No murmur heard. Pulmonary/Chest: Effort normal and breath sounds normal. No stridor. He has no wheezes. He has no rales. Abdominal: Soft. There is no tenderness. There is no rebound. Musculoskeletal: Normal range of motion. He exhibits no edema or tenderness. Neurological: He is alert and oriented to person, place, and time. He exhibits normal muscle tone. Skin: Skin is warm. He is not diaphoretic. Psychiatric: He has a normal mood and affect. His behavior is normal.       ASSESSMENT and PLAN    ICD-10-CM ICD-9-CM    1. Coronary artery disease of native artery of native heart with stable angina pectoris ([facility] [address] I25.118 414.01      413.9    2. Essential hypertension I10 401.9    3. Dyslipidemia, goal LDL below 70 E78.5 272.4    4. S/P right coronary artery (RCA) stent placement Z95.5 V45.82      Orders Placed This Encounter    isosorbide mononitrate ER (IMDUR) 30 mg tablet     Sig: Take 1 Tab by mouth every morning. Dispense:  30 Tab     Refill:  4     Follow-up Disposition:  Return in about 2 months (around 8/6/2018). current treatment plan is effective, no change in therapy  cardiovascular risk and specific lipid/LDL goals reviewed  use of aspirin to prevent MI and TIA's discussed. Patient with recent inferior wall MI- s/p RCA stent- also has residual moderate to severe LAd stenosis- stress test done failed to reveal any ischemia. plavix discontinued due to extensive brusing    He underwent recent surgery for paraesophageal hernia-- symptoms improving but not resolved. ? Mild stable angina-- will add imdur 30mg daily  He is scheduled for colonoscopy-- can proceed with low cardiac risk.   If symptoms worsen, will consider cardiac w/u in the next clinic visit.

## 2018-07-10 RX ORDER — AMLODIPINE BESYLATE 5 MG/1
TABLET ORAL
Qty: 30 TAB | Refills: 0 | Status: SHIPPED | OUTPATIENT
Start: 2018-07-10 | End: 2018-12-28 | Stop reason: SDUPTHER

## 2018-08-03 ENCOUNTER — OFFICE VISIT (OUTPATIENT)
Dept: CARDIOLOGY CLINIC | Age: 63
End: 2018-08-03

## 2018-08-03 VITALS
DIASTOLIC BLOOD PRESSURE: 81 MMHG | BODY MASS INDEX: 34.07 KG/M2 | SYSTOLIC BLOOD PRESSURE: 144 MMHG | WEIGHT: 230 LBS | HEIGHT: 69 IN

## 2018-08-03 DIAGNOSIS — E78.5 DYSLIPIDEMIA, GOAL LDL BELOW 70: ICD-10-CM

## 2018-08-03 DIAGNOSIS — Z95.5 S/P RIGHT CORONARY ARTERY (RCA) STENT PLACEMENT: ICD-10-CM

## 2018-08-03 DIAGNOSIS — I10 ESSENTIAL HYPERTENSION: ICD-10-CM

## 2018-08-03 DIAGNOSIS — I25.118 CORONARY ARTERY DISEASE OF NATIVE ARTERY OF NATIVE HEART WITH STABLE ANGINA PECTORIS (HCC): Primary | ICD-10-CM

## 2018-08-03 DIAGNOSIS — K44.9 HIATAL HERNIA: ICD-10-CM

## 2018-08-03 NOTE — PROGRESS NOTES
HISTORY OF PRESENT ILLNESS  Angely Maier III is a 66 y.o. male. Shortness of Breath   The history is provided by the patient. This is a chronic problem. The problem occurs rarely. The current episode started more than 1 week ago. The problem has not changed since onset. Associated symptoms include chest pain. Pertinent negatives include no fever, no cough, no sputum production, no hemoptysis, no PND, no orthopnea, no vomiting, no leg pain and no claudication. Associated medical issues include CAD and past MI. Associated medical issues do not include heart failure. Chest Pain (Angina)    The history is provided by the patient. This is a new problem. The problem occurs every several days. Associated symptoms include shortness of breath. Pertinent negatives include no claudication, no cough, no diaphoresis, no dizziness, no fever, no hemoptysis, no leg pain, no nausea, no near-syncope, no orthopnea, no palpitations, no PND, no sputum production, no vomiting and no weakness. Risk factors include hypertension, male gender and dyslipidemia. His past medical history is significant for HTN. His past medical history does not include DM. Procedural history includes cardiac catheterization, echocardiogram and cardiac stents. Review of Systems   Constitutional: Negative for diaphoresis and fever. Respiratory: Positive for shortness of breath. Negative for cough, hemoptysis and sputum production. Cardiovascular: Positive for chest pain. Negative for palpitations, orthopnea, claudication, PND and near-syncope. Gastrointestinal: Negative for nausea and vomiting. Neurological: Negative for dizziness and weakness.      Family History   Problem Relation Age of Onset    Colon Polyps Mother     Stroke Father     Hypertension Father     Cancer Brother        Past Medical History:   Diagnosis Date    Acid reflux     [facility]    GERD (gastroesophageal reflux disease)     Hypertension 2007    Ill-defined condition 12/2015    Echo EF 50%    OA (osteoarthritis)     Osteoarthritis of right hip 6/8/2014    Other ill-defined conditions(799.89)     hiatia hernia    Psychiatric disorder     S/P coronary artery stent placement     STEMI (ST elevation myocardial infarction) ([facility] [address] 12/02/2015    RCA stent       Past Surgical History:   Procedure Laterality Date    HX CORONARY STENT PLACEMENT Right 2015    right coronary     HX HIP REPLACEMENT      left hip replacement about 18 yrs. ago.  HX KNEE REPLACEMENT  6-04-12    right knee replacemtnt    HX OTHER SURGICAL      bilateral hip replacement    HX TONSILLECTOMY         Social History   Substance Use Topics    Smoking status: Never Smoker    Smokeless tobacco: Never Used    Alcohol use 0.0 oz/week     6 - 12 Cans of beer per week       Allergies   Allergen Reactions    Celebrex [Celecoxib] Other (comments)     Have hiatal hernia, bleeding concerns       Outpatient Prescriptions Marked as Taking for the 8/3/18 encounter (Office Visit) with Jesus Duarte MD   Medication Sig Dispense Refill    amLODIPine (NORVASC) 5 mg tablet TAKE ONE TABLET BY MOUTH DAILY 30 Tab 0    tamsulosin (FLOMAX) 0.4 mg capsule Take 0.4 mg by mouth daily.  GINKGO BILOBA PO Take  by mouth.  isosorbide mononitrate ER (IMDUR) 30 mg tablet Take 1 Tab by mouth every morning. 30 Tab 4    metoprolol tartrate (LOPRESSOR) 25 mg tablet TAKE 1 TABLET BY MOUTH EVERY 12 HOURS 180 Tab 0    rosuvastatin (CRESTOR) 20 mg tablet Take 20 mg by mouth nightly.  aspirin 81 mg chewable tablet Take 1 Tab by mouth daily. 30 Tab 6    multivitamin (ONE A DAY) tablet Take 1 Tab by mouth daily.  esomeprazole (NEXIUM) 40 mg capsule Take 40 mg by mouth as needed. Visit Vitals    /81    Ht 5' 9\" (1.753 m)    Wt 104.3 kg (230 lb)    BMI 33.97 kg/m2     Physical Exam   Constitutional: He is oriented to person, place, and time.  He appears well-developed and well-nourished. No distress. HENT:   Head: Atraumatic. Mouth/Throat: No oropharyngeal exudate. Eyes: Conjunctivae are normal. No scleral icterus. Neck: Neck supple. No JVD present. Cardiovascular: Normal rate and regular rhythm. Exam reveals no gallop. No murmur heard. Pulmonary/Chest: Effort normal and breath sounds normal. No stridor. He has no wheezes. He has no rales. Abdominal: Soft. There is no tenderness. There is no rebound. Musculoskeletal: Normal range of motion. He exhibits no edema or tenderness. Neurological: He is alert and oriented to person, place, and time. He exhibits normal muscle tone. Skin: Skin is warm. He is not diaphoretic. Psychiatric: He has a normal mood and affect. His behavior is normal.       ASSESSMENT and PLAN    ICD-10-CM ICD-9-CM    1. Coronary artery disease of native artery of native heart with stable angina pectoris ([facility] [address] I25.118 414.01      413.9    2. Essential hypertension I10 401.9    3. Dyslipidemia, goal LDL below 70 E78.5 272.4    4. S/P right coronary artery (RCA) stent placement Z95.5 V45.82    5. Hiatal hernia K44.9 553.3      No orders of the defined types were placed in this encounter. Follow-up Disposition:  Return in about 4 months (around 12/3/2018). current treatment plan is effective, no change in therapy  cardiovascular risk and specific lipid/LDL goals reviewed  use of aspirin to prevent MI and TIA's discussed. Patient with recent inferior wall MI- s/p RCA stent- also has residual moderate to severe LAd stenosis- stress test done failed to reveal any ischemia. plavix discontinued due to extensive brusing    He underwent recent surgery for paraesophageal hernia-- symptoms improving but not resolved. ? Mild stable angina-- on imdur 30mg daily  Had recent colonoscopy/ EGD with no significant findings.    Discussed at length regarding his meds and stable angina-- patient prefers medical management and will f/u in 4 months  Will obtain lipid panel from his PCP.

## 2018-08-03 NOTE — MR AVS SNAPSHOT
[address], [facility] [address] [phone] Patient: Mily Metzger 
MRN: [id number] Visit Information Date & Time Provider Department Dept. Phone Encounter #  
 8/3/2018 10:45 AM Luanne Cuellar MD [facility] [address] [place] [address] [id number] Follow-up Instructions Return in about 4 months (around 12/3/2018). Upcoming Health Maintenance Date Due DTaP/Tdap/Td series (1 - Tdap) 5/19/1978 FOBT Q 1 YEAR AGE 50-75 5/19/2007 ZOSTER VACCINE AGE 60> 3/19/2017 Influenza Age 5 to Adult 8/1/2018 Allergies as of 8/3/2018  Review Complete On: 8/3/2018 By: Nava Pang LPN Severity Noted Reaction Type Reactions Celebrex [Celecoxib]  06/09/2014    Other (comments) Have hiatal hernia, bleeding concerns Current Immunizations  Never Reviewed No immunizations on file. Not reviewed this visit You Were Diagnosed With   
  
 Codes Comments Coronary artery disease of native artery of native heart with stable angina pectoris ([facility] [address]    -  Primary ICD-10-CM: I25.118 
ICD-9-CM: 414.01, 413.9 Essential hypertension     ICD-10-CM: I10 
ICD-9-CM: 401.9 Dyslipidemia, goal LDL below 70     ICD-10-CM: E78.5 ICD-9-CM: 272.4 S/P right coronary artery (RCA) stent placement     ICD-10-CM: Z95.5 ICD-9-CM: V45.82 Hiatal hernia     ICD-10-CM: K44.9 ICD-9-CM: 712. 3 Vitals BP Height(growth percentile) Weight(growth percentile) BMI Smoking Status 144/81 5' 9\" (1.753 m) 230 lb (104.3 kg) 33.97 kg/m2 Never Smoker BMI and BSA Data Body Mass Index Body Surface Area  
 33.97 kg/m 2 2.25 m 2 Preferred Pharmacy Pharmacy Name Phone Meaghan Duque, [facility] [address] Your Updated Medication List  
  
   
This list is accurate as of 8/3/18 11:19 AM.  Always use your most recent med list. amLODIPine 5 mg tablet Commonly known as:  Karine Gonzales TAKE ONE TABLET BY MOUTH DAILY  
  
 aspirin 81 mg chewable tablet Take 1 Tab by mouth daily. CRESTOR 20 mg tablet Generic drug:  rosuvastatin Take 20 mg by mouth nightly. GINKGO BILOBA PO Take  by mouth.  
  
 isosorbide mononitrate ER 30 mg tablet Commonly known as:  IMDUR Take 1 Tab by mouth every morning. metoprolol tartrate 25 mg tablet Commonly known as:  LOPRESSOR  
TAKE 1 TABLET BY MOUTH EVERY 12 HOURS  
  
 multivitamin tablet Commonly known as:  ONE A DAY Take 1 Tab by mouth daily. NexIUM 40 mg capsule Generic drug:  esomeprazole Take 40 mg by mouth as needed. tamsulosin 0.4 mg capsule Commonly known as:  FLOMAX Take 0.4 mg by mouth daily. Follow-up Instructions Return in about 4 months (around 12/3/2018). Introducing [facility]! Dear Gurpreet Nieves: 
Thank you for requesting a [employer] account. Our records indicate that you already have an active [employer] account. You can access your account anytime at [email or web address] Did you know that you can access your hospital and ER discharge instructions at any time in [employer]? You can also review all of your test results from your hospital stay or ER visit. Additional Information If you have questions, please visit the Frequently Asked Questions section of the [employer] website at [email or web address] Remember, [employer] is NOT to be used for urgent needs. For medical emergencies, dial 911. Now available from your iPhone and Android! Please provide this summary of care documentation to your next provider. Your primary care clinician is listed as MARIA ELENA NAZARIO. If you have any questions after today's visit, please call [phone].

## 2018-09-12 NOTE — PROGRESS NOTES
1. Have you been to the ER, urgent care clinic since your last visit? Hospitalized since your last visit?     no    2. Have you seen or consulted any other health care providers outside of the [address] [place] [address] since your last visit? Include any pap smears or colon screening. Yes pcp    3. Since your last visit, have you had any of the following symptoms? No cardiac symptoms         4. Have you had any blood work, X-rays or cardiac testing? Yes pcp             5.  Where do you normally have your labs drawn?   pcp    6. Do you need any refills today?    no 12-Sep-2018 00:00

## 2019-02-19 ENCOUNTER — OFFICE VISIT (OUTPATIENT)
Dept: CARDIOLOGY CLINIC | Age: 64
End: 2019-02-19

## 2019-02-19 VITALS
BODY MASS INDEX: 33.77 KG/M2 | DIASTOLIC BLOOD PRESSURE: 74 MMHG | HEIGHT: 69 IN | HEART RATE: 61 BPM | WEIGHT: 228 LBS | SYSTOLIC BLOOD PRESSURE: 126 MMHG

## 2019-02-19 DIAGNOSIS — E78.5 DYSLIPIDEMIA, GOAL LDL BELOW 70: ICD-10-CM

## 2019-02-19 DIAGNOSIS — K44.9 HIATAL HERNIA: ICD-10-CM

## 2019-02-19 DIAGNOSIS — Z95.5 S/P RIGHT CORONARY ARTERY (RCA) STENT PLACEMENT: ICD-10-CM

## 2019-02-19 DIAGNOSIS — I25.118 CORONARY ARTERY DISEASE OF NATIVE ARTERY OF NATIVE HEART WITH STABLE ANGINA PECTORIS (HCC): ICD-10-CM

## 2019-02-19 DIAGNOSIS — I10 ESSENTIAL HYPERTENSION: Primary | ICD-10-CM

## 2019-02-19 NOTE — PROGRESS NOTES
Patient didn't bring medications, verbally reviewed    1. Have you been to the ER, urgent care clinic since your last visit? Hospitalized since your last visit? No    2. Have you seen or consulted any other health care providers outside of the [address] [place] [address] since your last visit? Include any pap smears or colon screening.  No

## 2019-02-19 NOTE — PROGRESS NOTES
HISTORY OF PRESENT ILLNESS  Anabel Barclay III is a 66 y.o. male. Shortness of Breath   The history is provided by the patient. This is a chronic problem. The problem occurs rarely. The current episode started more than 1 week ago. The problem has not changed since onset. Associated symptoms include chest pain. Pertinent negatives include no fever, no cough, no sputum production, no hemoptysis, no PND, no orthopnea, no vomiting, no leg pain and no claudication. Associated medical issues include CAD and past MI. Associated medical issues do not include heart failure. Chest Pain (Angina)    The history is provided by the patient. This is a new problem. The problem occurs every several days. Associated symptoms include shortness of breath. Pertinent negatives include no claudication, no cough, no diaphoresis, no dizziness, no fever, no hemoptysis, no leg pain, no nausea, no near-syncope, no orthopnea, no palpitations, no PND, no sputum production, no vomiting and no weakness. Risk factors include hypertension, male gender and dyslipidemia. His past medical history is significant for HTN. His past medical history does not include DM. Procedural history includes cardiac catheterization, echocardiogram and cardiac stents. Review of Systems   Constitutional: Negative for diaphoresis and fever. Respiratory: Positive for shortness of breath. Negative for cough, hemoptysis and sputum production. Cardiovascular: Positive for chest pain. Negative for palpitations, orthopnea, claudication, PND and near-syncope. Gastrointestinal: Negative for nausea and vomiting. Neurological: Negative for dizziness and weakness.      Family History   Problem Relation Age of Onset    Colon Polyps Mother     Stroke Father     Hypertension Father     Cancer Brother        Past Medical History:   Diagnosis Date    Acid reflux     [facility]    GERD (gastroesophageal reflux disease)     Hypertension 2007    Ill-defined condition 12/2015    Echo EF 50%    OA (osteoarthritis)     Osteoarthritis of right hip 6/8/2014    Other ill-defined conditions(799.89)     hiatia hernia    Psychiatric disorder     S/P coronary artery stent placement     STEMI (ST elevation myocardial infarction) ([facility] [address] 12/02/2015    RCA stent       Past Surgical History:   Procedure Laterality Date    HX CORONARY STENT PLACEMENT Right 2015    right coronary     HX HIP REPLACEMENT      left hip replacement about 18 yrs. ago.  HX KNEE REPLACEMENT  6-04-12    right knee replacemtnt    HX OTHER SURGICAL      bilateral hip replacement    HX TONSILLECTOMY         Social History     Tobacco Use    Smoking status: Never Smoker    Smokeless tobacco: Never Used   Substance Use Topics    Alcohol use: Yes     Alcohol/week: 0.0 oz     Types: 6 - 12 Cans of beer per week     Frequency: 2-3 times a week     Drinks per session: 5 or 6     Binge frequency: Weekly       Allergies   Allergen Reactions    Celebrex [Celecoxib] Other (comments)     Have hiatal hernia, bleeding concerns       Outpatient Medications Marked as Taking for the 2/19/19 encounter (Office Visit) with Unknown MD Renato   Medication Sig Dispense Refill    amLODIPine (NORVASC) 5 mg tablet TAKE ONE TABLET BY MOUTH DAILY 30 Tab 5    metoprolol tartrate (LOPRESSOR) 25 mg tablet TAKE 1 TABLET BY MOUTH EVERY 12 HOURS 180 Tab 1    tamsulosin (FLOMAX) 0.4 mg capsule Take 0.4 mg by mouth daily.  GINKGO BILOBA PO Take  by mouth.  rosuvastatin (CRESTOR) 20 mg tablet Take 20 mg by mouth nightly.  aspirin 81 mg chewable tablet Take 1 Tab by mouth daily. 30 Tab 6    multivitamin (ONE A DAY) tablet Take 1 Tab by mouth daily.  esomeprazole (NEXIUM) 40 mg capsule Take 40 mg by mouth as needed.           Visit Vitals  /74   Pulse 61   Ht 5' 9\" (1.753 m)   Wt 103.4 kg (228 lb)   BMI 33.67 kg/m²     Physical Exam   Constitutional: He is oriented to person, place, and time. He appears well-developed and well-nourished. No distress. HENT:   Head: Atraumatic. Mouth/Throat: No oropharyngeal exudate. Eyes: Conjunctivae are normal. No scleral icterus. Neck: Neck supple. No JVD present. Cardiovascular: Normal rate and regular rhythm. Exam reveals no gallop. No murmur heard. Pulmonary/Chest: Effort normal and breath sounds normal. No stridor. He has no wheezes. He has no rales. Abdominal: Soft. There is no tenderness. There is no rebound. Musculoskeletal: Normal range of motion. He exhibits no edema or tenderness. Neurological: He is alert and oriented to person, place, and time. He exhibits normal muscle tone. Skin: Skin is warm. He is not diaphoretic. Psychiatric: He has a normal mood and affect. His behavior is normal.     ekg sinus rhythm with old inferior infarct age undetermined    ASSESSMENT and PLAN    ICD-10-CM ICD-9-CM    1. Essential hypertension I10 401.9 AMB POC EKG ROUTINE W/ 12 LEADS, INTER & REP   2. Dyslipidemia, goal LDL below 70 E78.5 272.4    3. Coronary artery disease of native artery of native heart with stable angina pectoris ([facility] [address] I25.118 414.01      413.9    4. Hiatal hernia K44.9 553.3    5. S/P right coronary artery (RCA) stent placement Z95.5 V45.82      Orders Placed This Encounter    AMB POC EKG ROUTINE W/ 12 LEADS, INTER & REP     Order Specific Question:   Reason for Exam:     Answer:   htn     Follow-up Disposition:  Return in about 6 months (around 8/19/2019). current treatment plan is effective, no change in therapy  cardiovascular risk and specific lipid/LDL goals reviewed  use of aspirin to prevent MI and TIA's discussed. Patient with recent inferior wall MI- s/p RCA stent- also has residual moderate to severe LAd stenosis- stress test done failed to reveal any ischemia.   plavix discontinued due to extensive brusing    He underwent recent surgery for paraesophageal hernia-- Had recent colonoscopy/ EGD with no significant findings. Reports that his chest symptoms have significantly improved since the last appointment. He wants to continue conservative medical management at this time and he will follow-up in 6 months. We will obtain lipid panel report from his PCP.

## 2019-04-13 LAB — LDL-C, EXTERNAL: 70

## 2019-08-12 RX ORDER — AMLODIPINE BESYLATE 5 MG/1
TABLET ORAL
Qty: 30 TAB | Refills: 0 | Status: SHIPPED | OUTPATIENT
Start: 2019-08-12 | End: 2019-09-13 | Stop reason: SDUPTHER

## 2019-08-20 ENCOUNTER — OFFICE VISIT (OUTPATIENT)
Dept: CARDIOLOGY CLINIC | Age: 64
End: 2019-08-20

## 2019-08-20 VITALS
HEART RATE: 64 BPM | BODY MASS INDEX: 31.25 KG/M2 | HEIGHT: 69 IN | DIASTOLIC BLOOD PRESSURE: 72 MMHG | SYSTOLIC BLOOD PRESSURE: 108 MMHG | WEIGHT: 211 LBS

## 2019-08-20 DIAGNOSIS — Z95.5 S/P RIGHT CORONARY ARTERY (RCA) STENT PLACEMENT: ICD-10-CM

## 2019-08-20 DIAGNOSIS — I10 ESSENTIAL HYPERTENSION: Primary | ICD-10-CM

## 2019-08-20 DIAGNOSIS — E78.5 DYSLIPIDEMIA, GOAL LDL BELOW 70: ICD-10-CM

## 2019-08-20 DIAGNOSIS — I25.118 CORONARY ARTERY DISEASE OF NATIVE ARTERY OF NATIVE HEART WITH STABLE ANGINA PECTORIS (HCC): ICD-10-CM

## 2019-08-20 NOTE — PROGRESS NOTES
HISTORY OF PRESENT ILLNESS  Matthew Fritz III is a 60 y.o. male. Shortness of Breath   The history is provided by the patient. This is a chronic problem. The problem occurs rarely. The current episode started more than 1 week ago. The problem has not changed since onset. Associated symptoms include chest pain. Pertinent negatives include no fever, no cough, no sputum production, no hemoptysis, no PND, no orthopnea, no vomiting, no leg pain and no claudication. Associated medical issues include CAD and past MI. Associated medical issues do not include heart failure. Chest Pain (Angina)    The history is provided by the patient. This is a new problem. The problem occurs every several days. Associated symptoms include shortness of breath. Pertinent negatives include no claudication, no cough, no diaphoresis, no dizziness, no fever, no hemoptysis, no leg pain, no nausea, no near-syncope, no orthopnea, no palpitations, no PND, no sputum production, no vomiting and no weakness. Risk factors include hypertension, male gender and dyslipidemia. His past medical history is significant for HTN. His past medical history does not include DM. Procedural history includes cardiac catheterization, echocardiogram and cardiac stents. Review of Systems   Constitutional: Negative for diaphoresis and fever. Respiratory: Positive for shortness of breath. Negative for cough, hemoptysis and sputum production. Cardiovascular: Positive for chest pain. Negative for palpitations, orthopnea, claudication, PND and near-syncope. Gastrointestinal: Negative for nausea and vomiting. Neurological: Negative for dizziness and weakness.      Family History   Problem Relation Age of Onset    Colon Polyps Mother     Stroke Father     Hypertension Father     Cancer Brother        Past Medical History:   Diagnosis Date    Acid reflux     [facility]/2014    GERD (gastroesophageal reflux disease)     Hypertension 2007    Ill-defined condition 12/2015    Echo EF 50%    OA (osteoarthritis)     Osteoarthritis of right hip 6/8/2014    Other ill-defined conditions(799.89)     hiatia hernia    Psychiatric disorder     S/P coronary artery stent placement     STEMI (ST elevation myocardial infarction) ([facility] 12/02/2015    RCA stent       Past Surgical History:   Procedure Laterality Date    HX CORONARY STENT PLACEMENT Right 2015    right coronary     HX HIP REPLACEMENT      left hip replacement about 18 yrs. ago.  HX KNEE REPLACEMENT  6-04-12    right knee replacemtnt    HX OTHER SURGICAL      bilateral hip replacement    HX TONSILLECTOMY         Social History     Tobacco Use    Smoking status: Never Smoker    Smokeless tobacco: Never Used   Substance Use Topics    Alcohol use: Yes     Alcohol/week: 0.0 standard drinks     Types: 6 - 12 Cans of beer per week     Frequency: 2-3 times a week     Drinks per session: 5 or 6     Binge frequency: Weekly       Allergies   Allergen Reactions    Celebrex [Celecoxib] Other (comments)     Have hiatal hernia, bleeding concerns       Outpatient Medications Marked as Taking for the 8/20/19 encounter (Office Visit) with Rob Antonio MD   Medication Sig Dispense Refill    amLODIPine (NORVASC) 5 mg tablet TAKE ONE TABLET BY MOUTH ONCE DAILY 30 Tab 0    metoprolol tartrate (LOPRESSOR) 25 mg tablet TAKE 1 TABLET BY MOUTH EVERY 12 HOURS 180 Tab 3    tamsulosin (FLOMAX) 0.4 mg capsule Take 0.4 mg by mouth daily.  GINKGO BILOBA PO Take  by mouth.  rosuvastatin (CRESTOR) 20 mg tablet Take 20 mg by mouth nightly.  aspirin 81 mg chewable tablet Take 1 Tab by mouth daily. 30 Tab 6    esomeprazole (NEXIUM) 40 mg capsule Take 22.3 mg by mouth as needed. Visit Vitals  /72   Pulse 64   Ht 5' 9\" (1.753 m)   Wt 95.7 kg (211 lb)   BMI 31.16 kg/m²     Physical Exam   Constitutional: He is oriented to person, place, and time.  He appears well-developed and well-nourished. No distress. HENT:   Head: Atraumatic. Mouth/Throat: No oropharyngeal exudate. Eyes: Conjunctivae are normal. No scleral icterus. Neck: Neck supple. No JVD present. Cardiovascular: Normal rate and regular rhythm. Exam reveals no gallop. No murmur heard. Pulmonary/Chest: Effort normal and breath sounds normal. No stridor. He has no wheezes. He has no rales. Abdominal: Soft. There is no tenderness. There is no rebound. Musculoskeletal: Normal range of motion. He exhibits no edema or tenderness. Neurological: He is alert and oriented to person, place, and time. He exhibits normal muscle tone. Skin: Skin is warm. He is not diaphoretic. Psychiatric: He has a normal mood and affect. His behavior is normal.     ekg sinus rhythm with old inferior infarct age undetermined    ASSESSMENT and PLAN    ICD-10-CM ICD-9-CM    1. Essential hypertension I10 401.9    2. Dyslipidemia, goal LDL below 70 E78.5 272.4    3. Coronary artery disease of native artery of native heart with stable angina pectoris ([facility] I25.118 414.01      413.9    4. S/P right coronary artery (RCA) stent placement Z95.5 V45.82      No orders of the defined types were placed in this encounter. Follow-up and Dispositions    · Return in about 6 months (around 2/20/2020). current treatment plan is effective, no change in therapy  cardiovascular risk and specific lipid/LDL goals reviewed  use of aspirin to prevent MI and TIA's discussed. Patient with recent inferior wall MI- s/p RCA stent- also has residual moderate to severe LAd stenosis- stress test done failed to reveal any ischemia. plavix discontinued due to extensive brusing    He underwent recent surgery for paraesophageal hernia-- Had recent colonoscopy/ EGD with no significant findings. No new symptoms since the last visit. ET remains excellent. LDL on target.   F/u in 6 months I will SWITCH the dose or number of times a day I take the medications listed below when I get home from the hospital:  None

## 2019-08-20 NOTE — PROGRESS NOTES
Patient brought medications list.    1. Have you been to the ER, urgent care clinic since your last visit? Hospitalized since your last visit? No    2. Have you seen or consulted any other health care providers outside of the [address] [place] [address] since your last visit? Include any pap smears or colon screening.  No

## 2019-09-13 RX ORDER — AMLODIPINE BESYLATE 5 MG/1
TABLET ORAL
Qty: 30 TAB | Refills: 0 | Status: SHIPPED | OUTPATIENT
Start: 2019-09-13 | End: 2019-10-18 | Stop reason: SDUPTHER

## 2019-10-18 RX ORDER — AMLODIPINE BESYLATE 5 MG/1
TABLET ORAL
Qty: 30 TAB | Refills: 0 | Status: SHIPPED | OUTPATIENT
Start: 2019-10-18 | End: 2019-11-30 | Stop reason: SDUPTHER

## 2020-03-05 ENCOUNTER — OFFICE VISIT (OUTPATIENT)
Dept: CARDIOLOGY CLINIC | Age: 65
End: 2020-03-05

## 2020-03-05 VITALS
WEIGHT: 224 LBS | DIASTOLIC BLOOD PRESSURE: 76 MMHG | OXYGEN SATURATION: 96 % | HEART RATE: 67 BPM | HEIGHT: 69 IN | TEMPERATURE: 98.1 F | SYSTOLIC BLOOD PRESSURE: 135 MMHG | BODY MASS INDEX: 33.18 KG/M2

## 2020-03-05 DIAGNOSIS — Z95.5 S/P RIGHT CORONARY ARTERY (RCA) STENT PLACEMENT: ICD-10-CM

## 2020-03-05 DIAGNOSIS — I25.118 CORONARY ARTERY DISEASE OF NATIVE ARTERY OF NATIVE HEART WITH STABLE ANGINA PECTORIS (HCC): Primary | ICD-10-CM

## 2020-03-05 DIAGNOSIS — E78.5 DYSLIPIDEMIA, GOAL LDL BELOW 70: ICD-10-CM

## 2020-03-05 DIAGNOSIS — I10 ESSENTIAL HYPERTENSION: ICD-10-CM

## 2020-03-05 NOTE — PROGRESS NOTES
HISTORY OF PRESENT ILLNESS  Cheyenne Hansen III is a 60 y.o. male. Shortness of Breath   The history is provided by the patient. This is a chronic problem. The problem occurs rarely. The current episode started more than 1 week ago. The problem has not changed since onset. Associated symptoms include chest pain. Pertinent negatives include no fever, no cough, no sputum production, no hemoptysis, no PND, no orthopnea, no vomiting, no leg pain and no claudication. Associated medical issues include CAD and past MI. Associated medical issues do not include heart failure. Chest Pain (Angina)    The history is provided by the patient. This is a new problem. The problem occurs every several days. Associated symptoms include shortness of breath. Pertinent negatives include no claudication, no cough, no diaphoresis, no dizziness, no fever, no hemoptysis, no leg pain, no nausea, no near-syncope, no orthopnea, no palpitations, no PND, no sputum production, no vomiting and no weakness. Risk factors include hypertension, male gender and dyslipidemia. His past medical history is significant for HTN. His past medical history does not include DM. Procedural history includes cardiac catheterization, echocardiogram and cardiac stents. Hypertension   Associated symptoms include chest pain and shortness of breath. Review of Systems   Constitutional: Negative for diaphoresis and fever. Respiratory: Positive for shortness of breath. Negative for cough, hemoptysis and sputum production. Cardiovascular: Positive for chest pain. Negative for palpitations, orthopnea, claudication, PND and near-syncope. Gastrointestinal: Negative for nausea and vomiting. Neurological: Negative for dizziness and weakness.      Family History   Problem Relation Age of Onset    Colon Polyps Mother     Stroke Father     Hypertension Father     Cancer Brother        Past Medical History:   Diagnosis Date    Acid reflux     Failed total hip RN NOTES



PATIENT STABLE AT THIS TIME CONTINUE WITH  ETT WITH  SIMV SETTING  ONGOING.  SIMV TOLERATED 
WELL. AFEBRILE. VSS. SATURATION REMAINED >95%.OGT REMAINED INTACT AND PATENT.   IV SITE 
INTACT AND PATENT.  KEPT PT CLEAN AND DRY. ENDORSED CONTINUITY OF CARE TO AM NURSE. arthroplasty ([facility] 6/8/2014    GERD (gastroesophageal reflux disease)     Hypertension 2007    Ill-defined condition 12/2015    Echo EF 50%    OA (osteoarthritis)     Osteoarthritis of right hip 6/8/2014    Other ill-defined conditions(799.89)     hiatia hernia    Psychiatric disorder     S/P coronary artery stent placement     [facility]    RCA stent       Past Surgical History:   Procedure Laterality Date    HX CORONARY STENT PLACEMENT Right 2015    right coronary     HX HIP REPLACEMENT      left hip replacement about 18 yrs. ago.  HX KNEE REPLACEMENT  6-04-12    right knee replacemtnt    HX OTHER SURGICAL      bilateral hip replacement    HX TONSILLECTOMY         Social History     Tobacco Use    Smoking status: Never Smoker    Smokeless tobacco: Never Used   Substance Use Topics    Alcohol use: Yes     Alcohol/week: 0.0 standard drinks     Types: 6 - 12 Cans of beer per week     Frequency: 2-3 times a week     Drinks per session: 5 or 6     Binge frequency: Weekly       Allergies   Allergen Reactions    Celebrex [Celecoxib] Other (comments)     Have hiatal hernia, bleeding concerns       Outpatient Medications Marked as Taking for the 3/5/20 encounter (Office Visit) with Zay Bowens MD   Medication Sig Dispense Refill    amLODIPine (NORVASC) 5 mg tablet TAKE 1 TABLET BY MOUTH EVERY DAY 30 Tab 4    metoprolol tartrate (LOPRESSOR) 25 mg tablet TAKE 1 TABLET BY MOUTH EVERY 12 HOURS 180 Tab 3    tamsulosin (FLOMAX) 0.4 mg capsule Take 0.4 mg by mouth daily.  GINKGO BILOBA PO Take  by mouth.  rosuvastatin (CRESTOR) 20 mg tablet Take 20 mg by mouth nightly.  aspirin 81 mg chewable tablet Take 1 Tab by mouth daily. 30 Tab 6    esomeprazole (NEXIUM) 40 mg capsule Take 22.3 mg by mouth as needed.           Visit Vitals  /76 (BP 1 Location: Left arm, BP Patient Position: Sitting)   Pulse 67   Temp 98.1 °F (36.7 °C) (Oral)   Ht 5' 9\" (1.753 m)   Wt 101.6 kg (224 lb)   SpO2 96%   BMI 33.08 kg/m²     Physical Exam   Constitutional: He is oriented to person, place, and time. He appears well-developed and well-nourished. No distress. HENT:   Head: Atraumatic. Mouth/Throat: No oropharyngeal exudate. Eyes: Conjunctivae are normal. No scleral icterus. Neck: Neck supple. No JVD present. Cardiovascular: Normal rate and regular rhythm. Exam reveals no gallop. No murmur heard. Pulmonary/Chest: Effort normal and breath sounds normal. No stridor. He has no wheezes. He has no rales. Abdominal: Soft. There is no abdominal tenderness. There is no rebound. Musculoskeletal: Normal range of motion. General: No tenderness or edema. Neurological: He is alert and oriented to person, place, and time. He exhibits normal muscle tone. Skin: Skin is warm. He is not diaphoretic. Psychiatric: He has a normal mood and affect. His behavior is normal.     ekg sinus rhythm with old inferior infarct age undetermined    ASSESSMENT and PLAN    ICD-10-CM ICD-9-CM    1. Coronary artery disease of native artery of native heart with stable angina pectoris ([facility] I25.118 414.01 NUCLEAR CARDIAC STRESS TEST     413.9 ECHO ADULT COMPLETE   2. Essential hypertension I10 401.9 AMB POC EKG ROUTINE W/ 12 LEADS, INTER & REP   3. Dyslipidemia, goal LDL below 70 E78.5 272.4    4. S/P right coronary artery (RCA) stent placement Z95.5 V45.82      Orders Placed This Encounter    AMB POC EKG ROUTINE W/ 12 LEADS, INTER & REP     Order Specific Question:   Reason for Exam:     Answer:   HTN    ECHO ADULT COMPLETE     Standing Status:   Future     Standing Expiration Date:   9/5/2020     Order Specific Question:   Contrast Enhancement (Bubble Study, Definity, Optison) may be used if criteria listed in established evidence-based protocol has been identified.      Answer:   Yes     Follow-up and Dispositions    · Return in about 6 weeks (around 4/16/2020). current treatment plan is effective, no change in therapy  cardiovascular risk and specific lipid/LDL goals reviewed  use of aspirin to prevent MI and TIA's discussed. Patient with recent inferior wall MI- s/p RCA stent- also has residual moderate to severe LAd stenosis- stress test done failed to reveal any ischemia. plavix discontinued due to extensive brusing    He underwent recent surgery for paraesophageal hernia-- Had recent colonoscopy/ EGD with no significant findings. Patient seen for follow-up. Complains of mildly worsening chest pain the last few weeks. EKG reviewed no new changes. Given prior stent and moderate to severe lesion in the LAD we will proceed with nuclear stress test and echo to assess CAD. Meanwhile continue current medications.

## 2020-03-05 NOTE — PROGRESS NOTES
Patient brought medications list.    1. Have you been to the ER, urgent care clinic since your last visit? Hospitalized since your last visit? No    2. Have you seen or consulted any other health care providers outside of the [address] [place] [address] since your last visit? Include any pap smears or colon screening.  No

## 2020-07-16 RX ORDER — METOPROLOL TARTRATE 25 MG/1
TABLET, FILM COATED ORAL
Qty: 180 TAB | Refills: 3 | Status: SHIPPED | OUTPATIENT
Start: 2020-07-16 | End: 2022-08-11 | Stop reason: DRUGHIGH

## 2021-10-04 ENCOUNTER — TRANSCRIBE ORDER (OUTPATIENT)
Dept: SCHEDULING | Age: 66
End: 2021-10-04

## 2021-10-04 DIAGNOSIS — M79.89 SWELLING OF LOWER LEG: Primary | ICD-10-CM

## 2021-10-05 ENCOUNTER — HOSPITAL ENCOUNTER (OUTPATIENT)
Dept: VASCULAR SURGERY | Age: 66
Discharge: HOME | End: 2021-10-05

## 2021-10-05 DIAGNOSIS — M79.89 SWELLING OF LOWER LEG: ICD-10-CM

## 2021-10-05 PROCEDURE — 93971 EXTREMITY STUDY: CPT

## 2022-03-19 PROBLEM — K44.9 HIATAL HERNIA: Status: ACTIVE | Noted: 2017-09-08

## 2022-05-03 ENCOUNTER — OFFICE VISIT (OUTPATIENT)
Dept: CARDIOLOGY CLINIC | Age: 67
End: 2022-05-03
Payer: COMMERCIAL

## 2022-05-03 VITALS
HEART RATE: 70 BPM | OXYGEN SATURATION: 96 % | SYSTOLIC BLOOD PRESSURE: 137 MMHG | BODY MASS INDEX: 32.73 KG/M2 | DIASTOLIC BLOOD PRESSURE: 82 MMHG | HEIGHT: 69 IN | WEIGHT: 221 LBS

## 2022-05-03 DIAGNOSIS — I10 BENIGN ESSENTIAL HTN: ICD-10-CM

## 2022-05-03 DIAGNOSIS — Z95.5 H/O RIGHT CORONARY ARTERY STENT PLACEMENT: ICD-10-CM

## 2022-05-03 DIAGNOSIS — I25.118 CORONARY ARTERY DISEASE OF NATIVE ARTERY OF NATIVE HEART WITH STABLE ANGINA PECTORIS (HCC): Primary | ICD-10-CM

## 2022-05-03 DIAGNOSIS — E78.5 DYSLIPIDEMIA: ICD-10-CM

## 2022-05-03 PROCEDURE — 99214 OFFICE O/P EST MOD 30 MIN: CPT | Performed by: INTERNAL MEDICINE

## 2022-05-03 PROCEDURE — 93000 ELECTROCARDIOGRAM COMPLETE: CPT | Performed by: INTERNAL MEDICINE

## 2022-05-03 NOTE — PROGRESS NOTES
1. Have you been to the ER, urgent care clinic since your last visit? Hospitalized since your last visit?     no  2. Have you seen or consulted any other health care providers outside of the [address] [place] [address] since your last visit? Include any pap smears or colon screening.       Yes Where: [facility]

## 2022-05-03 NOTE — PROGRESS NOTES
HISTORY OF PRESENT ILLNESS  Gregory Martins III is a 61 y.o. male. Shortness of Breath  The history is provided by the patient. This is a chronic problem. The problem occurs rarely. The current episode started more than 1 week ago. The problem has not changed since onset. Associated medical issues include CAD and past MI. Associated medical issues do not include heart failure. ROS  Family History   Problem Relation Age of Onset    Colon Polyps Mother     Stroke Father     Hypertension Father     Cancer Brother        Past Medical History:   Diagnosis Date    Acid reflux     [facility]    GERD (gastroesophageal reflux disease)     Hypertension 2007    Ill-defined condition 12/2015    Echo EF 50%    OA (osteoarthritis)     Osteoarthritis of right hip 6/8/2014    Other ill-defined conditions(799.89)     hiatia hernia    Psychiatric disorder     S/P coronary artery stent placement     STEMI (ST elevation myocardial infarction) ([facility] 12/02/2015    RCA stent       Past Surgical History:   Procedure Laterality Date    HX CORONARY STENT PLACEMENT Right 2015    right coronary     HX HIP REPLACEMENT      left hip replacement about 18 yrs. ago.  HX KNEE REPLACEMENT  6-04-12    right knee replacemtnt    HX OTHER SURGICAL      bilateral hip replacement    HX TONSILLECTOMY         Social History     Tobacco Use    Smoking status: Never Smoker    Smokeless tobacco: Never Used   Substance Use Topics    Alcohol use:  Yes     Alcohol/week: 0.0 standard drinks     Types: 6 - 12 Cans of beer per week       Allergies   Allergen Reactions    Celebrex [Celecoxib] Other (comments)     Have hiatal hernia, bleeding concerns       Outpatient Medications Marked as Taking for the 5/3/22 encounter (Office Visit) with Alexander Major MD   Medication Sig Dispense Refill    amLODIPine (NORVASC) 5 mg tablet TAKE 1 TABLET BY MOUTH EVERY DAY 30 Tab 4    rosuvastatin (CRESTOR) 20 mg tablet Take 20 mg by mouth nightly.  aspirin 81 mg chewable tablet Take 1 Tab by mouth daily. 30 Tab 6    multivitamin (ONE A DAY) tablet Take 1 Tab by mouth daily.  esomeprazole (NEXIUM) 40 mg capsule Take 22.3 mg by mouth as needed. Visit Vitals  /82   Pulse 70   Ht 5' 9\" (1.753 m)   Wt 100.2 kg (221 lb)   SpO2 96%   BMI 32.64 kg/m²     Physical Exam  Constitutional:       General: He is not in acute distress. Appearance: He is well-developed. He is not diaphoretic. HENT:      Head: Atraumatic. Mouth/Throat:      Pharynx: No oropharyngeal exudate. Eyes:      General: No scleral icterus. Conjunctiva/sclera: Conjunctivae normal.   Neck:      Vascular: No JVD. Cardiovascular:      Rate and Rhythm: Normal rate and regular rhythm. Heart sounds: No murmur heard. No gallop. Pulmonary:      Effort: Pulmonary effort is normal.      Breath sounds: Normal breath sounds. No stridor. No wheezing or rales. Abdominal:      Palpations: Abdomen is soft. Tenderness: There is no abdominal tenderness. There is no rebound. Musculoskeletal:         General: No tenderness. Normal range of motion. Cervical back: Neck supple. Skin:     General: Skin is warm. Neurological:      Mental Status: He is alert and oriented to person, place, and time. Motor: No abnormal muscle tone. Psychiatric:         Behavior: Behavior normal.       ekg sinus rhythm with old inferior infarct age undetermined    ASSESSMENT and PLAN    ICD-10-CM ICD-9-CM    1. Coronary artery disease of native artery of native heart with stable angina pectoris (HCC)  I25.118 414.01 AMB POC EKG ROUTINE W/ 12 LEADS, INTER & REP     413.9    2. Benign essential HTN  I10 401.1    3. H/O right coronary artery stent placement  Z95.5 V45.82    4.  Dyslipidemia  E78.5 272.4      Orders Placed This Encounter    AMB POC EKG ROUTINE W/ 12 LEADS, INTER & REP     Order Specific Question:   Reason for Exam: Answer:   cad     Follow-up and Dispositions    · Return in about 9 months (around 2/3/2023). current treatment plan is effective, no change in therapy  cardiovascular risk and specific lipid/LDL goals reviewed  use of aspirin to prevent MI and TIA's discussed. Patient with recent inferior wall MI- s/p RCA stent- also has residual moderate to severe LAd stenosis-   Currently on optimal medical therapy with no significant angina or dyspnea. Will obtain lipid panel report from his PCP. Continue aspirin 81 mg indefinitely. Can proceed to planned endoscopy/colonoscopy with low cardiac risk. Okay to hold aspirin 5 to 7 days prior to procedure and resume it postop as soon as possible.

## 2022-08-04 LAB — LDL-C, EXTERNAL: 67

## 2022-08-11 ENCOUNTER — OFFICE VISIT (OUTPATIENT)
Dept: CARDIOLOGY CLINIC | Age: 67
End: 2022-08-11
Payer: COMMERCIAL

## 2022-08-11 VITALS
BODY MASS INDEX: 33.18 KG/M2 | HEIGHT: 69 IN | WEIGHT: 224 LBS | OXYGEN SATURATION: 97 % | SYSTOLIC BLOOD PRESSURE: 119 MMHG | DIASTOLIC BLOOD PRESSURE: 69 MMHG | HEART RATE: 69 BPM

## 2022-08-11 DIAGNOSIS — I20.0 UNSTABLE ANGINA PECTORIS (HCC): Primary | ICD-10-CM

## 2022-08-11 DIAGNOSIS — E78.5 DYSLIPIDEMIA, GOAL LDL BELOW 70: ICD-10-CM

## 2022-08-11 DIAGNOSIS — Z95.5 H/O RIGHT CORONARY ARTERY STENT PLACEMENT: ICD-10-CM

## 2022-08-11 DIAGNOSIS — I25.10 CORONARY ARTERY DISEASE INVOLVING NATIVE CORONARY ARTERY OF NATIVE HEART WITHOUT ANGINA PECTORIS: ICD-10-CM

## 2022-08-11 DIAGNOSIS — I10 HYPERTENSION, UNSPECIFIED TYPE: ICD-10-CM

## 2022-08-11 PROCEDURE — 1123F ACP DISCUSS/DSCN MKR DOCD: CPT | Performed by: NURSE PRACTITIONER

## 2022-08-11 PROCEDURE — 99214 OFFICE O/P EST MOD 30 MIN: CPT | Performed by: NURSE PRACTITIONER

## 2022-08-11 RX ORDER — METOPROLOL SUCCINATE 100 MG/1
100 TABLET, EXTENDED RELEASE ORAL DAILY
COMMUNITY
Start: 2022-08-03

## 2022-08-11 RX ORDER — NITROGLYCERIN 0.4 MG/1
TABLET SUBLINGUAL
COMMUNITY
Start: 2022-08-03

## 2022-08-11 NOTE — PROGRESS NOTES
HISTORY OF PRESENT ILLNESS  Dhara Bah III is a 74 y.o. male. 8/2022  Patient presents due to recent elevated b/p and episodes of chest pressure with radiation to neck. He denies shortness of breath, palpitations or edema. Shortness of Breath  The history is provided by the Patient. This is a chronic problem. The problem occurs rarely. The current episode started more than 1 week ago. The problem has not changed since onset. Associated symptoms include neck pain and chest pain. Pertinent negatives include no cough, no wheezing, no PND, no orthopnea, no vomiting, no leg swelling and no claudication. Associated medical issues include CAD and past MI. Associated medical issues do not include heart failure. Follow-up  Associated symptoms include chest pain. Pertinent negatives include no shortness of breath. Review of Systems   Constitutional:  Negative for malaise/fatigue. HENT:  Negative for congestion. Eyes:  Negative for double vision and redness. Respiratory:  Negative for cough, shortness of breath and wheezing. Cardiovascular:  Positive for chest pain. Negative for palpitations, orthopnea, claudication, leg swelling and PND. Gastrointestinal:  Negative for nausea and vomiting. Musculoskeletal:  Positive for neck pain. Negative for falls. Neurological:  Negative for dizziness. Endo/Heme/Allergies:  Does not bruise/bleed easily.    Family History   Problem Relation Age of Onset    Colon Polyps Mother     Stroke Father     Hypertension Father     Cancer Brother        Past Medical History:   Diagnosis Date    Acid reflux     Failed total hip arthroplasty ([facility] [address] 6/8/2014    GERD (gastroesophageal reflux disease)     Hypertension 2007    Ill-defined condition 12/2015    Echo EF 50%    OA (osteoarthritis)     Osteoarthritis of right hip 6/8/2014    Other ill-defined conditions(799.89)     hiatia hernia    Psychiatric disorder     S/P coronary artery stent placement     STEMI (ST elevation myocardial infarction) ([facility] [address] 12/02/2015    RCA stent       Past Surgical History:   Procedure Laterality Date    HX CORONARY STENT PLACEMENT Right 2015    right coronary     HX HIP REPLACEMENT      left hip replacement about 18 yrs. ago. HX KNEE REPLACEMENT  6-04-12    right knee replacemtnt    HX OTHER SURGICAL      bilateral hip replacement    HX TONSILLECTOMY         Social History     Tobacco Use    Smoking status: Never    Smokeless tobacco: Never   Substance Use Topics    Alcohol use: Yes     Alcohol/week: 0.0 standard drinks     Types: 6 - 12 Cans of beer per week       Allergies   Allergen Reactions    Celebrex [Celecoxib] Other (comments)     Have hiatal hernia, bleeding concerns       Outpatient Medications Marked as Taking for the 8/11/22 encounter (Office Visit) with Marichuy Loving NP   Medication Sig Dispense Refill    metoprolol succinate (TOPROL-XL) 100 mg tablet Take 100 mg by mouth in the morning. nitroglycerin (NITROSTAT) 0.4 mg SL tablet PLACE ONE TABLET UNDER TONGUE EVERY 5 MINUTES AS NEEDED FOR CHEST PAIN. MAXIMUM OF 3 TABLETS. IF NO RELIEF, CALL 911      amLODIPine (NORVASC) 5 mg tablet TAKE 1 TABLET BY MOUTH EVERY DAY 30 Tab 4    tamsulosin (FLOMAX) 0.4 mg capsule Take 0.4 mg by mouth in the morning. rosuvastatin (CRESTOR) 20 mg tablet Take 20 mg by mouth nightly. aspirin 81 mg chewable tablet Take 1 Tab by mouth daily. 30 Tab 6    multivitamin (ONE A DAY) tablet Take 1 Tab by mouth daily. esomeprazole (NEXIUM) 40 mg capsule Take 22.3 mg by mouth as needed. Visit Vitals  /69 (BP 1 Location: Left upper arm, BP Patient Position: Sitting, BP Cuff Size: Adult)   Pulse 69   Ht 5' 9\" (1.753 m)   Wt 101.6 kg (224 lb)   SpO2 97%   BMI 33.08 kg/m²     Physical Exam  Vitals and nursing note reviewed. Constitutional:       General: He is not in acute distress. Appearance: He is well-developed. He is not diaphoretic. HENT:      Head: Atraumatic. Mouth/Throat:      Pharynx: No oropharyngeal exudate. Eyes:      General: No scleral icterus. Conjunctiva/sclera: Conjunctivae normal.   Neck:      Vascular: No JVD. Cardiovascular:      Rate and Rhythm: Normal rate and regular rhythm. Heart sounds: No murmur heard. No gallop. Pulmonary:      Effort: Pulmonary effort is normal.      Breath sounds: Normal breath sounds. No stridor. No wheezing or rales. Abdominal:      Palpations: Abdomen is soft. Tenderness: There is no abdominal tenderness. There is no rebound. Musculoskeletal:         General: No tenderness. Normal range of motion. Cervical back: Neck supple. Right lower leg: No edema. Left lower leg: No edema. Skin:     General: Skin is warm. Neurological:      Mental Status: He is alert and oriented to person, place, and time. Motor: No abnormal muscle tone. Psychiatric:         Behavior: Behavior normal.     ekg sinus rhythm with old inferior infarct age undetermined    ASSESSMENT and PLAN    ICD-10-CM ICD-9-CM    1. Unstable angina pectoris (HCC)  I20.0 411.1 NUCLEAR CARDIAC STRESS TEST      2. Coronary artery disease involving native coronary artery of native heart without angina pectoris  I25.10 414.01       3. H/O right coronary artery stent placement  Z95.5 V45.82       4. Hypertension, unspecified type  I10 401.9       5. Dyslipidemia, goal LDL below 70  E78.5 272.4         No orders of the defined types were placed in this encounter. Follow-up and Dispositions    Return in about 3 weeks (around 9/1/2022) for Post testing. current treatment plan is effective, no change in therapy  cardiovascular risk and specific lipid/LDL goals reviewed  use of aspirin to prevent MI and TIA's discussed. Patient with recent inferior wall MI- s/p RCA stent- also has residual moderate to severe LAd stenosis-   Currently on optimal medical therapy with no significant angina or dyspnea.   Will obtain lipid panel report from his PCP. Continue aspirin 81 mg indefinitely. Can proceed to planned endoscopy/colonoscopy with low cardiac risk. Okay to hold aspirin 5 to 7 days prior to procedure and resume it postop as soon as possible. 8/2022  Patient with history of CAD with RCA stent and moderate to severe LAD stenosis. Blood pressure is controlled with current medications. Will obtain nuclear stress test to rule out ischemia. Recent lipid panel reviewed and discussed with patient LDL 67. Continue statin, aspirin, bb and amlodipine.

## 2022-08-11 NOTE — PROGRESS NOTES
1. Have you been to the ER, urgent care clinic since your last visit? Hospitalized since your last visit? No    2. Have you seen or consulted any other health care providers outside of the [address] [place] [address] since your last visit? Include any pap smears or colon screening.  No

## 2022-08-16 ENCOUNTER — OFFICE VISIT (OUTPATIENT)
Dept: CARDIOLOGY CLINIC | Age: 67
End: 2022-08-16
Payer: COMMERCIAL

## 2022-08-16 VITALS
OXYGEN SATURATION: 95 % | HEIGHT: 69 IN | WEIGHT: 224 LBS | HEART RATE: 62 BPM | SYSTOLIC BLOOD PRESSURE: 143 MMHG | BODY MASS INDEX: 33.18 KG/M2 | DIASTOLIC BLOOD PRESSURE: 83 MMHG

## 2022-08-16 DIAGNOSIS — Z95.5 H/O RIGHT CORONARY ARTERY STENT PLACEMENT: ICD-10-CM

## 2022-08-16 DIAGNOSIS — I10 HYPERTENSION, UNSPECIFIED TYPE: ICD-10-CM

## 2022-08-16 DIAGNOSIS — I25.10 CORONARY ARTERY DISEASE INVOLVING NATIVE CORONARY ARTERY OF NATIVE HEART WITHOUT ANGINA PECTORIS: ICD-10-CM

## 2022-08-16 DIAGNOSIS — E78.5 DYSLIPIDEMIA, GOAL LDL BELOW 70: ICD-10-CM

## 2022-08-16 DIAGNOSIS — I20.0 UNSTABLE ANGINA PECTORIS (HCC): Primary | ICD-10-CM

## 2022-08-16 PROCEDURE — 1123F ACP DISCUSS/DSCN MKR DOCD: CPT | Performed by: NURSE PRACTITIONER

## 2022-08-16 PROCEDURE — 99214 OFFICE O/P EST MOD 30 MIN: CPT | Performed by: NURSE PRACTITIONER

## 2022-08-16 NOTE — H&P (VIEW-ONLY)
HISTORY OF PRESENT ILLNESS  Chriss Farrell III is a 74 y.o. male. 8/2022  Patient presents due to recent elevated b/p and episodes of chest pressure with radiation to neck. He denies shortness of breath, palpitations or edema. 8/16/2022  Patient presents to follow-up for stress test results. Shortness of Breath  The history is provided by the Patient. This is a chronic problem. The problem occurs rarely. The current episode started more than 1 week ago. The problem has not changed since onset. Associated symptoms include neck pain and chest pain. Pertinent negatives include no cough, no wheezing, no PND, no orthopnea, no vomiting, no leg swelling and no claudication. Associated medical issues include CAD and past MI. Associated medical issues do not include heart failure. Follow-up  Associated symptoms include chest pain. Pertinent negatives include no shortness of breath. Review of Systems   Constitutional:  Negative for malaise/fatigue. HENT:  Negative for congestion. Eyes:  Negative for double vision and redness. Respiratory:  Negative for cough, shortness of breath and wheezing. Cardiovascular:  Positive for chest pain. Negative for palpitations, orthopnea, claudication, leg swelling and PND. Gastrointestinal:  Negative for nausea and vomiting. Musculoskeletal:  Positive for neck pain. Negative for falls. Neurological:  Negative for dizziness. Endo/Heme/Allergies:  Does not bruise/bleed easily.    Family History   Problem Relation Age of Onset    Colon Polyps Mother     Stroke Father     Hypertension Father     Cancer Brother        Past Medical History:   Diagnosis Date    Acid reflux     Failed total hip arthroplasty ([facility] [address] 6/8/2014    GERD (gastroesophageal reflux disease)     Hypertension 2007    Ill-defined condition 12/2015    Echo EF 50%    OA (osteoarthritis)     Osteoarthritis of right hip 6/8/2014    Other ill-defined conditions(799.89)     hiatia hernia    Psychiatric disorder     S/P coronary artery stent placement     STEMI (ST elevation myocardial infarction) ([facility] [address] 12/02/2015    RCA stent       Past Surgical History:   Procedure Laterality Date    HX CORONARY STENT PLACEMENT Right 2015    right coronary     HX HIP REPLACEMENT      left hip replacement about 18 yrs. ago. HX KNEE REPLACEMENT  6-04-12    right knee replacemtnt    HX OTHER SURGICAL      bilateral hip replacement    HX TONSILLECTOMY         Social History     Tobacco Use    Smoking status: Never    Smokeless tobacco: Never   Substance Use Topics    Alcohol use: Yes     Alcohol/week: 0.0 standard drinks     Types: 6 - 12 Cans of beer per week       Allergies   Allergen Reactions    Celebrex [Celecoxib] Other (comments)     Have hiatal hernia, bleeding concerns       Outpatient Medications Marked as Taking for the 8/16/22 encounter (Office Visit) with Eric Cotto NP   Medication Sig Dispense Refill    metoprolol succinate (TOPROL-XL) 100 mg tablet Take 100 mg by mouth in the morning. nitroglycerin (NITROSTAT) 0.4 mg SL tablet PLACE ONE TABLET UNDER TONGUE EVERY 5 MINUTES AS NEEDED FOR CHEST PAIN. MAXIMUM OF 3 TABLETS. IF NO RELIEF, CALL 911      amLODIPine (NORVASC) 5 mg tablet TAKE 1 TABLET BY MOUTH EVERY DAY 30 Tab 4    tamsulosin (FLOMAX) 0.4 mg capsule Take 0.4 mg by mouth in the morning. rosuvastatin (CRESTOR) 20 mg tablet Take 20 mg by mouth nightly. aspirin 81 mg chewable tablet Take 1 Tab by mouth daily. 30 Tab 6    esomeprazole (NEXIUM) 40 mg capsule Take 22.3 mg by mouth as needed. Visit Vitals  BP (!) 143/83   Pulse 62   Ht 5' 9\" (1.753 m)   Wt 101.6 kg (224 lb)   SpO2 95%   BMI 33.08 kg/m²       Physical Exam  Vitals and nursing note reviewed. Constitutional:       General: He is not in acute distress. Appearance: He is well-developed. He is not diaphoretic. HENT:      Head: Atraumatic. Mouth/Throat:      Pharynx: No oropharyngeal exudate.    Eyes: General: No scleral icterus. Conjunctiva/sclera: Conjunctivae normal.   Neck:      Vascular: No JVD. Cardiovascular:      Rate and Rhythm: Normal rate and regular rhythm. Heart sounds: No murmur heard. No gallop. Pulmonary:      Effort: Pulmonary effort is normal.      Breath sounds: Normal breath sounds. No stridor. No wheezing or rales. Abdominal:      Palpations: Abdomen is soft. Tenderness: There is no abdominal tenderness. There is no rebound. Musculoskeletal:         General: No tenderness. Normal range of motion. Cervical back: Neck supple. Right lower leg: No edema. Left lower leg: No edema. Skin:     General: Skin is warm. Neurological:      Mental Status: He is alert and oriented to person, place, and time. Motor: No abnormal muscle tone. Psychiatric:         Behavior: Behavior normal.     8/15/2022  Nuclear stress test  Interpretation Summary       Nuclear Findings: Abnormal left ventricular systolic function post-stress. LVEF measures 35%. Nuclear Findings: LVEF measures 35%. There is a moderate severity left ventricular stress perfusion defect that is medium to large in size present in the basal to mid inferior and inferolateral segment(s) that is reversible. The defect is consistent with abnormal perfusion in the LCx or RCA territories. Viability in the area is moderate. There is normal wall motion in the defect area. The defect appears to be jennifer-infarct ischemia. Nuclear Findings: Findings suggest a moderate risk of myocardial ischemia. LVEF measures 35%. Nuclear Findings: LVEF measures 35%. There is evidence of inducible ischemia. ECG: Resting ECG demonstrates sinus bradycardia and first-degree AV block. ECG: Stress ECG was negative for ischemia. Stress Test: A pharmacological stress test was performed using lexiscan. Hemodynamics are adequate for diagnosis.  Blood pressure demonstrated a normal response and heart rate demonstrated a blunted response to stress. The patient's heart rate recovery was normal. The patient reported no symptoms during the stress test.      ekg sinus rhythm with old inferior infarct age undetermined    ASSESSMENT and PLAN    ICD-10-CM ICD-9-CM    1. Unstable angina pectoris (HCC)  I20.0 411.1 ECHO ADULT COMPLETE      2. Coronary artery disease involving native coronary artery of native heart without angina pectoris  I25.10 414.01 ECHO ADULT COMPLETE      CASE REQUEST CATH LAB      CBC W/O DIFF      METABOLIC PANEL, BASIC      PROTHROMBIN TIME + INR      XR CHEST PA LAT      3. H/O right coronary artery stent placement  Z95.5 V45.82       4. Hypertension, unspecified type  I10 401.9       5. Dyslipidemia, goal LDL below 70  E78.5 272.4         Orders Placed This Encounter    XR CHEST PA LAT     Standing Status:   Future     Standing Expiration Date:   9/16/2023    CBC W/O DIFF     Standing Status:   Future     Standing Expiration Date:   4/99/2319    METABOLIC PANEL, BASIC     Standing Status:   Future     Standing Expiration Date:   2/16/2023    PROTHROMBIN TIME + INR     Standing Status:   Future     Standing Expiration Date:   2/16/2023    ECHO ADULT COMPLETE     Standing Status:   Future     Standing Expiration Date:   8/16/2023     Order Specific Question:   Contrast Enhancement (Bubble Study, Definity, Optison) may be used if criteria listed in established evidence-based protocol has been identified. Answer:   Yes     Order Specific Question:   Enhanced Imaging (Myocardial Strain, 3D post-processing) may be used if criteria listed in established evidence-based protocol has been identified. Answer:   Yes       current treatment plan is effective, no change in therapy  cardiovascular risk and specific lipid/LDL goals reviewed  use of aspirin to prevent MI and TIA's discussed.     Patient with recent inferior wall MI- s/p RCA stent- also has residual moderate to severe LAd stenosis-   Currently on optimal medical therapy with no significant angina or dyspnea. Will obtain lipid panel report from his PCP. Continue aspirin 81 mg indefinitely. Can proceed to planned endoscopy/colonoscopy with low cardiac risk. Okay to hold aspirin 5 to 7 days prior to procedure and resume it postop as soon as possible. 8/2022  Patient with history of CAD with RCA stent and moderate to severe LAD stenosis. Blood pressure is controlled with current medications. Will obtain nuclear stress test to rule out ischemia. Recent lipid panel reviewed and discussed with patient LDL 67. Continue statin, aspirin, bb and amlodipine. 8/16/2022  Patient with history of CAD with stents to RCA. Seen in follow-up for stress test results. Nuclear stress test reviewed and discussed with patient this is abnormal with perfusion defect in the left circumflex or RCA territories. Will obtain echocardiogram to follow LV function. Cardiac cath reviewed and discussed with patient he would like to proceed. Patient to continue medical therapy with beta-blocker, statin aspirin and amlodipine. Follow-up postprocedure  THE PATIENT UNDERSTANDS THAT ALTHOUGH RARE, SEVERE  UNEXPECTED COMPLICATIONS CAN OCCUR WITH EACH TYPE OF CARDIAC CATH PROCEDURE.   THESE RISKS INCLUDE,  BUT ARE NOT LIMITED TO: ALLERGIC REACTION, INFECTION, BLEEDING, BLOOD VESSEL INJURY,   KIDNEY INJURY FROM X-RAY DYE, PUNCTURE OF THE HEART/LUNGS,   EMERGENT OPEN HEART SURGERY, HEART ATTACK, STROKE, CARDIAC  ARREST OR DEATH OR NEED FOR EMERGENCY CARDIAC BYPASS SURGERY

## 2022-08-16 NOTE — PROGRESS NOTES
1. Have you been to the ER, urgent care clinic since your last visit? Hospitalized since your last visit?     no  2. Have you seen or consulted any other health care providers outside of the [address] [place] [address] since your last visit? Include any pap smears or colon screening.       No

## 2022-08-16 NOTE — PROGRESS NOTES
HISTORY OF PRESENT ILLNESS  Randolph Cornell III is a 74 y.o. male. 8/2022  Patient presents due to recent elevated b/p and episodes of chest pressure with radiation to neck. He denies shortness of breath, palpitations or edema. 8/16/2022  Patient presents to follow-up for stress test results. Shortness of Breath  The history is provided by the Patient. This is a chronic problem. The problem occurs rarely. The current episode started more than 1 week ago. The problem has not changed since onset. Associated symptoms include neck pain and chest pain. Pertinent negatives include no cough, no wheezing, no PND, no orthopnea, no vomiting, no leg swelling and no claudication. Associated medical issues include CAD and past MI. Associated medical issues do not include heart failure. Follow-up  Associated symptoms include chest pain. Pertinent negatives include no shortness of breath. Review of Systems   Constitutional:  Negative for malaise/fatigue. HENT:  Negative for congestion. Eyes:  Negative for double vision and redness. Respiratory:  Negative for cough, shortness of breath and wheezing. Cardiovascular:  Positive for chest pain. Negative for palpitations, orthopnea, claudication, leg swelling and PND. Gastrointestinal:  Negative for nausea and vomiting. Musculoskeletal:  Positive for neck pain. Negative for falls. Neurological:  Negative for dizziness. Endo/Heme/Allergies:  Does not bruise/bleed easily.    Family History   Problem Relation Age of Onset    Colon Polyps Mother     Stroke Father     Hypertension Father     Cancer Brother        Past Medical History:   Diagnosis Date    Acid reflux     Failed total hip arthroplasty ([facility] [address] 6/8/2014    GERD (gastroesophageal reflux disease)     Hypertension 2007    Ill-defined condition 12/2015    Echo EF 50%    OA (osteoarthritis)     Osteoarthritis of right hip 6/8/2014    Other ill-defined conditions(799.89)     hiatia hernia    Psychiatric disorder     S/P coronary artery stent placement     STEMI (ST elevation myocardial infarction) ([facility] [address] 12/02/2015    RCA stent       Past Surgical History:   Procedure Laterality Date    HX CORONARY STENT PLACEMENT Right 2015    right coronary     HX HIP REPLACEMENT      left hip replacement about 18 yrs. ago. HX KNEE REPLACEMENT  6-04-12    right knee replacemtnt    HX OTHER SURGICAL      bilateral hip replacement    HX TONSILLECTOMY         Social History     Tobacco Use    Smoking status: Never    Smokeless tobacco: Never   Substance Use Topics    Alcohol use: Yes     Alcohol/week: 0.0 standard drinks     Types: 6 - 12 Cans of beer per week       Allergies   Allergen Reactions    Celebrex [Celecoxib] Other (comments)     Have hiatal hernia, bleeding concerns       Outpatient Medications Marked as Taking for the 8/16/22 encounter (Office Visit) with Ever Tinajero NP   Medication Sig Dispense Refill    metoprolol succinate (TOPROL-XL) 100 mg tablet Take 100 mg by mouth in the morning. nitroglycerin (NITROSTAT) 0.4 mg SL tablet PLACE ONE TABLET UNDER TONGUE EVERY 5 MINUTES AS NEEDED FOR CHEST PAIN. MAXIMUM OF 3 TABLETS. IF NO RELIEF, CALL 911      amLODIPine (NORVASC) 5 mg tablet TAKE 1 TABLET BY MOUTH EVERY DAY 30 Tab 4    tamsulosin (FLOMAX) 0.4 mg capsule Take 0.4 mg by mouth in the morning. rosuvastatin (CRESTOR) 20 mg tablet Take 20 mg by mouth nightly. aspirin 81 mg chewable tablet Take 1 Tab by mouth daily. 30 Tab 6    esomeprazole (NEXIUM) 40 mg capsule Take 22.3 mg by mouth as needed. Visit Vitals  BP (!) 143/83   Pulse 62   Ht 5' 9\" (1.753 m)   Wt 101.6 kg (224 lb)   SpO2 95%   BMI 33.08 kg/m²       Physical Exam  Vitals and nursing note reviewed. Constitutional:       General: He is not in acute distress. Appearance: He is well-developed. He is not diaphoretic. HENT:      Head: Atraumatic. Mouth/Throat:      Pharynx: No oropharyngeal exudate.    Eyes: General: No scleral icterus. Conjunctiva/sclera: Conjunctivae normal.   Neck:      Vascular: No JVD. Cardiovascular:      Rate and Rhythm: Normal rate and regular rhythm. Heart sounds: No murmur heard. No gallop. Pulmonary:      Effort: Pulmonary effort is normal.      Breath sounds: Normal breath sounds. No stridor. No wheezing or rales. Abdominal:      Palpations: Abdomen is soft. Tenderness: There is no abdominal tenderness. There is no rebound. Musculoskeletal:         General: No tenderness. Normal range of motion. Cervical back: Neck supple. Right lower leg: No edema. Left lower leg: No edema. Skin:     General: Skin is warm. Neurological:      Mental Status: He is alert and oriented to person, place, and time. Motor: No abnormal muscle tone. Psychiatric:         Behavior: Behavior normal.     8/15/2022  Nuclear stress test  Interpretation Summary       Nuclear Findings: Abnormal left ventricular systolic function post-stress. LVEF measures 35%. Nuclear Findings: LVEF measures 35%. There is a moderate severity left ventricular stress perfusion defect that is medium to large in size present in the basal to mid inferior and inferolateral segment(s) that is reversible. The defect is consistent with abnormal perfusion in the LCx or RCA territories. Viability in the area is moderate. There is normal wall motion in the defect area. The defect appears to be jennifer-infarct ischemia. Nuclear Findings: Findings suggest a moderate risk of myocardial ischemia. LVEF measures 35%. Nuclear Findings: LVEF measures 35%. There is evidence of inducible ischemia. ECG: Resting ECG demonstrates sinus bradycardia and first-degree AV block. ECG: Stress ECG was negative for ischemia. Stress Test: A pharmacological stress test was performed using lexiscan. Hemodynamics are adequate for diagnosis.  Blood pressure demonstrated a normal response and heart rate demonstrated a blunted response to stress. The patient's heart rate recovery was normal. The patient reported no symptoms during the stress test.      ekg sinus rhythm with old inferior infarct age undetermined    ASSESSMENT and PLAN    ICD-10-CM ICD-9-CM    1. Unstable angina pectoris (HCC)  I20.0 411.1 ECHO ADULT COMPLETE      2. Coronary artery disease involving native coronary artery of native heart without angina pectoris  I25.10 414.01 ECHO ADULT COMPLETE      CASE REQUEST CATH LAB      CBC W/O DIFF      METABOLIC PANEL, BASIC      PROTHROMBIN TIME + INR      XR CHEST PA LAT      3. H/O right coronary artery stent placement  Z95.5 V45.82       4. Hypertension, unspecified type  I10 401.9       5. Dyslipidemia, goal LDL below 70  E78.5 272.4         Orders Placed This Encounter    XR CHEST PA LAT     Standing Status:   Future     Standing Expiration Date:   9/16/2023    CBC W/O DIFF     Standing Status:   Future     Standing Expiration Date:   8/87/1126    METABOLIC PANEL, BASIC     Standing Status:   Future     Standing Expiration Date:   2/16/2023    PROTHROMBIN TIME + INR     Standing Status:   Future     Standing Expiration Date:   2/16/2023    ECHO ADULT COMPLETE     Standing Status:   Future     Standing Expiration Date:   8/16/2023     Order Specific Question:   Contrast Enhancement (Bubble Study, Definity, Optison) may be used if criteria listed in established evidence-based protocol has been identified. Answer:   Yes     Order Specific Question:   Enhanced Imaging (Myocardial Strain, 3D post-processing) may be used if criteria listed in established evidence-based protocol has been identified. Answer:   Yes       current treatment plan is effective, no change in therapy  cardiovascular risk and specific lipid/LDL goals reviewed  use of aspirin to prevent MI and TIA's discussed.     Patient with recent inferior wall MI- s/p RCA stent- also has residual moderate to severe LAd stenosis-   Currently on optimal medical therapy with no significant angina or dyspnea. Will obtain lipid panel report from his PCP. Continue aspirin 81 mg indefinitely. Can proceed to planned endoscopy/colonoscopy with low cardiac risk. Okay to hold aspirin 5 to 7 days prior to procedure and resume it postop as soon as possible. 8/2022  Patient with history of CAD with RCA stent and moderate to severe LAD stenosis. Blood pressure is controlled with current medications. Will obtain nuclear stress test to rule out ischemia. Recent lipid panel reviewed and discussed with patient LDL 67. Continue statin, aspirin, bb and amlodipine. 8/16/2022  Patient with history of CAD with stents to RCA. Seen in follow-up for stress test results. Nuclear stress test reviewed and discussed with patient this is abnormal with perfusion defect in the left circumflex or RCA territories. Will obtain echocardiogram to follow LV function. Cardiac cath reviewed and discussed with patient he would like to proceed. Patient to continue medical therapy with beta-blocker, statin aspirin and amlodipine. Follow-up postprocedure  THE PATIENT UNDERSTANDS THAT ALTHOUGH RARE, SEVERE  UNEXPECTED COMPLICATIONS CAN OCCUR WITH EACH TYPE OF CARDIAC CATH PROCEDURE.   THESE RISKS INCLUDE,  BUT ARE NOT LIMITED TO: ALLERGIC REACTION, INFECTION, BLEEDING, BLOOD VESSEL INJURY,   KIDNEY INJURY FROM X-RAY DYE, PUNCTURE OF THE HEART/LUNGS,   EMERGENT OPEN HEART SURGERY, HEART ATTACK, STROKE, CARDIAC  ARREST OR DEATH OR NEED FOR EMERGENCY CARDIAC BYPASS SURGERY

## 2022-08-16 NOTE — PATIENT INSTRUCTIONS
Instructions    Patients Name:  Dhara Bah III    You are scheduled to have a Cardiac Catherization on 8/23/22  at 11:45 am Please check in at 9:30am.     [facility] to [facility] and park in the outpatient parking lot that is located around to the back of the hospital and enter through the [facility] building. Once you enter through the [facility] check in with the  there. The  will either give you directions or assist you in getting to the cath holding area. You are not to eat anything after midnight before the procedure. Please continue to drink fluids up until midnight on 8/22/22. (water, juice, black coffee, soft drinks). Do not drink milk or milk products or anything with cream. You may take medications(except for diabetes) with a small sip of water before 6am on the day of the procedure. .    If you are diabetic, do not take your insulin/sugar pill the morning of the procedure. MEDICATION INSTRUCTIONS:   Please take your morning medications with the following special instructions:    []          Please make sure to take your Blood pressure medication : with a sip of water.    []          Take your Aspirin and/or Plavix. We encourage families to wait in the waiting room on the first floor while the procedure is being done. The Doctor will come out and talk with you as soon as the procedure is over. There is the possibility that you may spend the night in the hospital, depending on the results of the procedure. This will be determined after the procedure is done. If angioplasty or stent is planned, you will stay at least one day. If you or your family have any questions, please call our office Monday -Friday, 9:00 a.m.-4:30 p.m.,  At [phone], and ask to speak to one of the nurses.

## 2022-08-17 ENCOUNTER — HOSPITAL ENCOUNTER (OUTPATIENT)
Dept: GENERAL RADIOLOGY | Age: 67
Discharge: HOME | End: 2022-08-17
Payer: COMMERCIAL

## 2022-08-17 ENCOUNTER — TELEPHONE (OUTPATIENT)
Dept: CARDIOLOGY CLINIC | Age: 67
End: 2022-08-17

## 2022-08-17 ENCOUNTER — HOSPITAL ENCOUNTER (OUTPATIENT)
Dept: LAB | Age: 67
Discharge: HOME | End: 2022-08-17
Payer: COMMERCIAL

## 2022-08-17 ENCOUNTER — TRANSCRIBE ORDER (OUTPATIENT)
Dept: REGISTRATION | Age: 67
End: 2022-08-17

## 2022-08-17 DIAGNOSIS — I25.10 CORONARY ATHEROSCLEROSIS OF NATIVE CORONARY ARTERY: ICD-10-CM

## 2022-08-17 DIAGNOSIS — I25.10 CORONARY ATHEROSCLEROSIS OF NATIVE CORONARY ARTERY: Primary | ICD-10-CM

## 2022-08-17 LAB
ANION GAP SERPL CALC-SCNC: 4 MMOL/L (ref 3–18)
BUN SERPL-MCNC: 15 MG/DL (ref 7–18)
BUN/CREAT SERPL: 19 (ref 12–20)
CALCIUM SERPL-MCNC: 9.8 MG/DL (ref 8.5–10.1)
CHLORIDE SERPL-SCNC: 108 MMOL/L (ref 100–111)
CO2 SERPL-SCNC: 29 MMOL/L (ref 21–32)
CREAT SERPL-MCNC: 0.77 MG/DL (ref 0.6–1.3)
ERYTHROCYTE [DISTWIDTH] IN BLOOD BY AUTOMATED COUNT: 13.1 % (ref 11.6–14.5)
GLUCOSE SERPL-MCNC: 104 MG/DL (ref 74–99)
HCT VFR BLD AUTO: 45.2 % (ref 36–48)
HGB BLD-MCNC: 15.7 G/DL (ref 13–16)
INR PPP: 1 (ref 0.8–1.2)
MCH RBC QN AUTO: 30.3 PG (ref 24–34)
MCHC RBC AUTO-ENTMCNC: 34.7 G/DL (ref 31–37)
MCV RBC AUTO: 87.1 FL (ref 78–100)
NRBC # BLD: 0 K/UL (ref 0–0.01)
NRBC BLD-RTO: 0 PER 100 WBC
PLATELET # BLD AUTO: 144 K/UL (ref 135–420)
PMV BLD AUTO: 9.3 FL (ref 9.2–11.8)
POTASSIUM SERPL-SCNC: 4.1 MMOL/L (ref 3.5–5.5)
PROTHROMBIN TIME: 13.6 SEC (ref 11.5–15.2)
RBC # BLD AUTO: 5.19 M/UL (ref 4.35–5.65)
SODIUM SERPL-SCNC: 141 MMOL/L (ref 136–145)
WBC # BLD AUTO: 6.5 K/UL (ref 4.6–13.2)

## 2022-08-17 PROCEDURE — 85610 PROTHROMBIN TIME: CPT

## 2022-08-17 PROCEDURE — 71046 X-RAY EXAM CHEST 2 VIEWS: CPT

## 2022-08-17 PROCEDURE — 85027 COMPLETE CBC AUTOMATED: CPT

## 2022-08-17 PROCEDURE — 36415 COLL VENOUS BLD VENIPUNCTURE: CPT

## 2022-08-17 PROCEDURE — 80048 BASIC METABOLIC PNL TOTAL CA: CPT

## 2022-08-17 NOTE — TELEPHONE ENCOUNTER
----- Message from Rowdy Call NP sent at 8/17/2022  3:35 PM EDT -----  Please call patient, advise labs are normal.   Follow up as scheduled

## 2022-08-23 ENCOUNTER — HOSPITAL ENCOUNTER (OUTPATIENT)
Age: 67
Setting detail: OBSERVATION
Discharge: HOME | End: 2022-08-24
Attending: INTERNAL MEDICINE | Admitting: INTERNAL MEDICINE
Payer: COMMERCIAL

## 2022-08-23 DIAGNOSIS — I20.0 UNSTABLE ANGINA (HCC): ICD-10-CM

## 2022-08-23 DIAGNOSIS — I25.110 CORONARY ARTERY DISEASE INVOLVING NATIVE CORONARY ARTERY OF NATIVE HEART WITH UNSTABLE ANGINA PECTORIS (HCC): Primary | ICD-10-CM

## 2022-08-23 DIAGNOSIS — I25.10 CORONARY ARTERY DISEASE, UNSPECIFIED VESSEL OR LESION TYPE, UNSPECIFIED WHETHER ANGINA PRESENT, UNSPECIFIED WHETHER NATIVE OR TRANSPLANTED HEART: ICD-10-CM

## 2022-08-23 LAB
ACT BLD: 190 SECS (ref 79–138)
ACT BLD: 248 SECS (ref 79–138)
ACT BLD: 416 SECS (ref 79–138)
ATRIAL RATE: 48 BPM
CALCULATED P AXIS, ECG09: 29 DEGREES
CALCULATED R AXIS, ECG10: -19 DEGREES
CALCULATED T AXIS, ECG11: 51 DEGREES
DIAGNOSIS, 93000: NORMAL
P-R INTERVAL, ECG05: 212 MS
Q-T INTERVAL, ECG07: 470 MS
QRS DURATION, ECG06: 120 MS
QTC CALCULATION (BEZET), ECG08: 419 MS
VENTRICULAR RATE, ECG03: 48 BPM

## 2022-08-23 PROCEDURE — 74011250637 HC RX REV CODE- 250/637: Performed by: INTERNAL MEDICINE

## 2022-08-23 PROCEDURE — 92928 PRQ TCAT PLMT NTRAC ST 1 LES: CPT | Performed by: INTERNAL MEDICINE

## 2022-08-23 PROCEDURE — 77030040934 HC CATH DIAG DXTERITY MEDT -A: Performed by: INTERNAL MEDICINE

## 2022-08-23 PROCEDURE — G0378 HOSPITAL OBSERVATION PER HR: HCPCS

## 2022-08-23 PROCEDURE — 93454 CORONARY ARTERY ANGIO S&I: CPT | Performed by: INTERNAL MEDICINE

## 2022-08-23 PROCEDURE — 74011250636 HC RX REV CODE- 250/636: Performed by: INTERNAL MEDICINE

## 2022-08-23 PROCEDURE — 77030015766: Performed by: INTERNAL MEDICINE

## 2022-08-23 PROCEDURE — C1887 CATHETER, GUIDING: HCPCS | Performed by: INTERNAL MEDICINE

## 2022-08-23 PROCEDURE — 99152 MOD SED SAME PHYS/QHP 5/>YRS: CPT | Performed by: INTERNAL MEDICINE

## 2022-08-23 PROCEDURE — 85347 COAGULATION TIME ACTIVATED: CPT

## 2022-08-23 PROCEDURE — 77030027845 HC BND COM RDL D-STAT TELE -B: Performed by: INTERNAL MEDICINE

## 2022-08-23 PROCEDURE — 93571 IV DOP VEL&/PRESS C FLO 1ST: CPT | Performed by: INTERNAL MEDICINE

## 2022-08-23 PROCEDURE — 93005 ELECTROCARDIOGRAM TRACING: CPT

## 2022-08-23 PROCEDURE — 77030013519 HC DEV INFL BASIX MRTM -B: Performed by: INTERNAL MEDICINE

## 2022-08-23 PROCEDURE — 74011000636 HC RX REV CODE- 636: Performed by: INTERNAL MEDICINE

## 2022-08-23 PROCEDURE — C1725 CATH, TRANSLUMIN NON-LASER: HCPCS | Performed by: INTERNAL MEDICINE

## 2022-08-23 PROCEDURE — 99153 MOD SED SAME PHYS/QHP EA: CPT | Performed by: INTERNAL MEDICINE

## 2022-08-23 PROCEDURE — 77030013797 HC KT TRNSDUC PRSSR EDWD -A: Performed by: INTERNAL MEDICINE

## 2022-08-23 PROCEDURE — C1769 GUIDE WIRE: HCPCS | Performed by: INTERNAL MEDICINE

## 2022-08-23 PROCEDURE — 93572 IV DOP VEL&/PRESS C FLO EA: CPT | Performed by: INTERNAL MEDICINE

## 2022-08-23 PROCEDURE — C1894 INTRO/SHEATH, NON-LASER: HCPCS | Performed by: INTERNAL MEDICINE

## 2022-08-23 PROCEDURE — 74011000250 HC RX REV CODE- 250: Performed by: INTERNAL MEDICINE

## 2022-08-23 PROCEDURE — C1874 STENT, COATED/COV W/DEL SYS: HCPCS | Performed by: INTERNAL MEDICINE

## 2022-08-23 DEVICE — STENT RONYX25030UX RESOLUTE ONYX 2.50X30
Type: IMPLANTABLE DEVICE | Status: FUNCTIONAL
Brand: RESOLUTE ONYX™

## 2022-08-23 RX ORDER — ACETAMINOPHEN 325 MG/1
650 TABLET ORAL
Status: DISCONTINUED | OUTPATIENT
Start: 2022-08-23 | End: 2022-08-24 | Stop reason: HOSPADM

## 2022-08-23 RX ORDER — TAMSULOSIN HYDROCHLORIDE 0.4 MG/1
0.4 CAPSULE ORAL DAILY
Status: DISCONTINUED | OUTPATIENT
Start: 2022-08-24 | End: 2022-08-24 | Stop reason: HOSPADM

## 2022-08-23 RX ORDER — HYDRALAZINE HYDROCHLORIDE 20 MG/ML
20 INJECTION INTRAMUSCULAR; INTRAVENOUS
Status: DISCONTINUED | OUTPATIENT
Start: 2022-08-23 | End: 2022-08-24 | Stop reason: HOSPADM

## 2022-08-23 RX ORDER — VERAPAMIL HYDROCHLORIDE 2.5 MG/ML
INJECTION, SOLUTION INTRAVENOUS AS NEEDED
Status: DISCONTINUED | OUTPATIENT
Start: 2022-08-23 | End: 2022-08-23 | Stop reason: HOSPADM

## 2022-08-23 RX ORDER — METOPROLOL SUCCINATE 100 MG/1
100 TABLET, EXTENDED RELEASE ORAL DAILY
Status: DISCONTINUED | OUTPATIENT
Start: 2022-08-24 | End: 2022-08-24 | Stop reason: HOSPADM

## 2022-08-23 RX ORDER — HEPARIN SODIUM 1000 [USP'U]/ML
INJECTION, SOLUTION INTRAVENOUS; SUBCUTANEOUS AS NEEDED
Status: DISCONTINUED | OUTPATIENT
Start: 2022-08-23 | End: 2022-08-23 | Stop reason: HOSPADM

## 2022-08-23 RX ORDER — LIDOCAINE HYDROCHLORIDE 10 MG/ML
INJECTION, SOLUTION EPIDURAL; INFILTRATION; INTRACAUDAL; PERINEURAL AS NEEDED
Status: DISCONTINUED | OUTPATIENT
Start: 2022-08-23 | End: 2022-08-23 | Stop reason: HOSPADM

## 2022-08-23 RX ORDER — PANTOPRAZOLE SODIUM 20 MG/1
20 TABLET, DELAYED RELEASE ORAL
Status: DISCONTINUED | OUTPATIENT
Start: 2022-08-24 | End: 2022-08-24 | Stop reason: HOSPADM

## 2022-08-23 RX ORDER — SODIUM CHLORIDE 9 MG/ML
75 INJECTION, SOLUTION INTRAVENOUS CONTINUOUS
Status: DISPENSED | OUTPATIENT
Start: 2022-08-23 | End: 2022-08-24

## 2022-08-23 RX ORDER — HEPARIN SODIUM 200 [USP'U]/100ML
INJECTION, SOLUTION INTRAVENOUS
Status: DISCONTINUED
Start: 2022-08-23 | End: 2022-08-23

## 2022-08-23 RX ORDER — HEPARIN SODIUM 1000 [USP'U]/ML
INJECTION, SOLUTION INTRAVENOUS; SUBCUTANEOUS
Status: DISCONTINUED
Start: 2022-08-23 | End: 2022-08-23

## 2022-08-23 RX ORDER — FENTANYL CITRATE 50 UG/ML
INJECTION, SOLUTION INTRAMUSCULAR; INTRAVENOUS
Status: DISCONTINUED
Start: 2022-08-23 | End: 2022-08-23

## 2022-08-23 RX ORDER — ATROPINE SULFATE 0.1 MG/ML
0.5 INJECTION INTRAVENOUS AS NEEDED
Status: DISCONTINUED | OUTPATIENT
Start: 2022-08-23 | End: 2022-08-24 | Stop reason: HOSPADM

## 2022-08-23 RX ORDER — NITROGLYCERIN 0.4 MG/1
0.4 TABLET SUBLINGUAL AS NEEDED
Status: DISCONTINUED | OUTPATIENT
Start: 2022-08-23 | End: 2022-08-24 | Stop reason: HOSPADM

## 2022-08-23 RX ORDER — LABETALOL HCL 20 MG/4 ML
10 SYRINGE (ML) INTRAVENOUS
Status: DISCONTINUED | OUTPATIENT
Start: 2022-08-23 | End: 2022-08-24 | Stop reason: HOSPADM

## 2022-08-23 RX ORDER — SODIUM CHLORIDE 0.9 % (FLUSH) 0.9 %
5-40 SYRINGE (ML) INJECTION EVERY 8 HOURS
Status: DISCONTINUED | OUTPATIENT
Start: 2022-08-23 | End: 2022-08-24 | Stop reason: HOSPADM

## 2022-08-23 RX ORDER — SODIUM CHLORIDE 0.9 % (FLUSH) 0.9 %
5-40 SYRINGE (ML) INJECTION AS NEEDED
Status: DISCONTINUED | OUTPATIENT
Start: 2022-08-23 | End: 2022-08-24 | Stop reason: HOSPADM

## 2022-08-23 RX ORDER — ROSUVASTATIN CALCIUM 20 MG/1
20 TABLET, COATED ORAL
Status: DISCONTINUED | OUTPATIENT
Start: 2022-08-23 | End: 2022-08-24 | Stop reason: HOSPADM

## 2022-08-23 RX ORDER — GUAIFENESIN 100 MG/5ML
81 LIQUID (ML) ORAL DAILY
Status: DISCONTINUED | OUTPATIENT
Start: 2022-08-24 | End: 2022-08-24 | Stop reason: HOSPADM

## 2022-08-23 RX ORDER — LIDOCAINE HYDROCHLORIDE 10 MG/ML
INJECTION, SOLUTION EPIDURAL; INFILTRATION; INTRACAUDAL; PERINEURAL
Status: DISCONTINUED
Start: 2022-08-23 | End: 2022-08-23

## 2022-08-23 RX ORDER — AMLODIPINE BESYLATE 5 MG/1
5 TABLET ORAL DAILY
Status: DISCONTINUED | OUTPATIENT
Start: 2022-08-24 | End: 2022-08-24 | Stop reason: HOSPADM

## 2022-08-23 RX ORDER — FENTANYL CITRATE 50 UG/ML
INJECTION, SOLUTION INTRAMUSCULAR; INTRAVENOUS AS NEEDED
Status: DISCONTINUED | OUTPATIENT
Start: 2022-08-23 | End: 2022-08-23 | Stop reason: HOSPADM

## 2022-08-23 RX ORDER — VERAPAMIL HYDROCHLORIDE 2.5 MG/ML
INJECTION, SOLUTION INTRAVENOUS
Status: DISCONTINUED
Start: 2022-08-23 | End: 2022-08-23

## 2022-08-23 RX ORDER — TEMAZEPAM 15 MG/1
15 CAPSULE ORAL
Status: DISCONTINUED | OUTPATIENT
Start: 2022-08-23 | End: 2022-08-24 | Stop reason: HOSPADM

## 2022-08-23 RX ORDER — MIDAZOLAM HYDROCHLORIDE 1 MG/ML
INJECTION, SOLUTION INTRAMUSCULAR; INTRAVENOUS
Status: DISCONTINUED
Start: 2022-08-23 | End: 2022-08-23

## 2022-08-23 RX ORDER — HEPARIN SODIUM 200 [USP'U]/100ML
INJECTION, SOLUTION INTRAVENOUS
Status: COMPLETED | OUTPATIENT
Start: 2022-08-23 | End: 2022-08-23

## 2022-08-23 RX ORDER — MIDAZOLAM HYDROCHLORIDE 1 MG/ML
INJECTION, SOLUTION INTRAMUSCULAR; INTRAVENOUS AS NEEDED
Status: DISCONTINUED | OUTPATIENT
Start: 2022-08-23 | End: 2022-08-23 | Stop reason: HOSPADM

## 2022-08-23 RX ADMIN — SODIUM CHLORIDE, PRESERVATIVE FREE 10 ML: 5 INJECTION INTRAVENOUS at 21:55

## 2022-08-23 RX ADMIN — ROSUVASTATIN CALCIUM 20 MG: 20 TABLET, COATED ORAL at 21:55

## 2022-08-23 RX ADMIN — SODIUM CHLORIDE 75 ML/HR: 9 INJECTION, SOLUTION INTRAVENOUS at 13:45

## 2022-08-23 NOTE — ROUTINE PROCESS
Wound Prevention Checklist    Patient: Mary Rebollar III (26 y.o. male)  Date: 8/23/2022  Diagnosis: Coronary artery disease, unspecified vessel or lesion type, unspecified whether angina present, unspecified whether native or transplanted heart [I25.10]  Unstable angina (HCC) [I20.0]  CAD (coronary artery disease) [I25.10] Coronary artery disease involving native coronary artery of native heart with unstable angina pectoris ([facility] [address]    Precautions:         [x]  Heel prevention boots placed on patient    [x]  Patient turned q2h during shift    [x]  Lift team ordered    [x]  Patient on [place] bed/Specialty bed    [x]  Each Wound is documented during shift (Stage, Color, drainage, odor, measurements, and dressings)    [x]  Dual skin checks done at bedside during shift report with Shree Wagner cath lab mary ann Toney RN

## 2022-08-23 NOTE — PROGRESS NOTES
Cath holding summary     Patient escorted to cath holding from waiting area ambulatory, alert and oriented x 4, voicing no complaints. Changed into gown and placed on monitor. NPO since MN. Lab results, med rec and H&P reviewed on chart. PIV x 2   inserted without difficulty. Family to bedside.

## 2022-08-23 NOTE — PROGRESS NOTES
1430  Assumed care of patient. Received report from [place], [facility]. VSS and NAD. DSTAT to right radial.       8112  DSTAT removed per hospital policy. No bleeding, no hematoma noted at site. Hemostatic dressing applied with wrist splint. Patient tolerated. Instructed on importance of keeping wrist in neutral position, patient verbalized understanding. 1402  Verbal report provided to [facility], RN who will assume care of patient in CVT Stepdown, room #2315.

## 2022-08-23 NOTE — Clinical Note
TRANSFER - OUT REPORT:     Verbal report given to: Marisabel Quintero RN. Report consisted of patient's Situation, Background, Assessment and   Recommendations(SBAR). Opportunity for questions and clarification was provided. Patient transported with a Cardiac Cath Tech / Patient Care Tech. Patient transported to: holding area.

## 2022-08-23 NOTE — PROGRESS NOTES
TRANSFER - IN REPORT:    Verbal report received from Duyen Bonilla on Nobie Doom III  being received from Cath Lab for routine post - op      Report consisted of patients Situation, Background, Assessment and   Recommendations(SBAR). Information from the following report(s) SBAR, Kardex, Procedure Summary, and MAR was reviewed with the receiving nurse. Opportunity for questions and clarification was provided. Assessment completed upon patients arrival to unit and care assumed.

## 2022-08-23 NOTE — Clinical Note
Contrast Dose Calculator:   Patient's age: 74.   Patient's sex: Male. Patient weight (kg) = 101.6. Creatinine level (mg/dL) = 0.77. Creatinine clearance (mL/min): 137. Max Contrast dose per Creatinine Cl calculator = 308.25 mL.

## 2022-08-23 NOTE — PROGRESS NOTES
Problem: Falls - Risk of  Goal: *Absence of Falls  Description: Document Shaheen Hernandez Fall Risk and appropriate interventions in the flowsheet. Outcome: Progressing Towards Goal  Note: Fall Risk Interventions:            Medication Interventions: Patient to call before getting OOB                   Problem: Patient Education: Go to Patient Education Activity  Goal: Patient/Family Education  Outcome: Progressing Towards Goal     Problem:  Moderate Sedation (Adult)  Goal: *Patent airway  Outcome: Progressing Towards Goal  Goal: *Adequate oxygenation  Outcome: Progressing Towards Goal  Goal: *Absence of aspiration  Outcome: Progressing Towards Goal  Goal: *Hemodynamically stable  Outcome: Progressing Towards Goal  Goal: *Optimal pain control at patient's stated goal  Outcome: Progressing Towards Goal  Goal: *Absence of nausea/vomiting  Outcome: Progressing Towards Goal  Goal: *Anxiety reduced or absent  Outcome: Progressing Towards Goal  Goal: *Absence of injury  Outcome: Progressing Towards Goal  Goal: *Level of consciousness returns to baseline  Outcome: Progressing Towards Goal  Goal: Interventions  Outcome: Progressing Towards Goal     Problem: Patient Education: Go to Patient Education Activity  Goal: Patient/Family Education  Outcome: Progressing Towards Goal

## 2022-08-23 NOTE — INTERVAL H&P NOTE
Update History & Physical    The Patient's History and Physical of August 16,   Cardiac catheterization/PCI/emergency CABG was reviewed with the patient and I examined the patient. There was no change. The surgical site was confirmed by the patient and me. Plan:  The risk, benefits, expected outcome, and alternative to the recommended procedure have been discussed with the patient. Patient understands and wants to proceed with the procedure.     Electronically signed by Shakira Nieves MD on 8/23/2022 at 10:22 AM

## 2022-08-23 NOTE — Clinical Note
TRANSFER - IN REPORT:     Verbal report received from: Richy Andres RN. Report consisted of patient's Situation, Background, Assessment and   Recommendations(SBAR). Opportunity for questions and clarification was provided. Assessment completed upon patient's arrival to unit and care assumed. Patient transported with a Registered Nurse.

## 2022-08-23 NOTE — Clinical Note
D-stat closure device moved during transport of patient and RRA site ooze occurred. New D-stat closure device applied to site and site no ooze noted at site.

## 2022-08-23 NOTE — PROGRESS NOTES
TRANSFER - IN REPORT:    Verbal report received from Sruthi (name) on Ardie Labs III  being received from cath holding (unit) for routine progression of care      Report consisted of patients Situation, Background, Assessment and   Recommendations(SBAR). Information from the following report(s) SBAR, Intake/Output, Med Rec Status, and Cardiac Rhythm pain  was reviewed with the receiving nurse. Opportunity for questions and clarification was provided. Assessment completed upon patients arrival to unit and care assumed.

## 2022-08-24 VITALS
TEMPERATURE: 98.3 F | HEIGHT: 69 IN | HEART RATE: 65 BPM | OXYGEN SATURATION: 97 % | WEIGHT: 216 LBS | BODY MASS INDEX: 31.99 KG/M2 | SYSTOLIC BLOOD PRESSURE: 155 MMHG | RESPIRATION RATE: 15 BRPM | DIASTOLIC BLOOD PRESSURE: 84 MMHG

## 2022-08-24 PROCEDURE — 74011250637 HC RX REV CODE- 250/637: Performed by: PHYSICIAN ASSISTANT

## 2022-08-24 PROCEDURE — 74011250637 HC RX REV CODE- 250/637: Performed by: INTERNAL MEDICINE

## 2022-08-24 PROCEDURE — 74011000250 HC RX REV CODE- 250: Performed by: INTERNAL MEDICINE

## 2022-08-24 PROCEDURE — G0378 HOSPITAL OBSERVATION PER HR: HCPCS

## 2022-08-24 RX ORDER — ISOSORBIDE MONONITRATE 30 MG/1
30 TABLET, EXTENDED RELEASE ORAL
Qty: 30 TABLET | Refills: 0 | Status: SHIPPED | OUTPATIENT
Start: 2022-08-24 | End: 2022-09-21 | Stop reason: SDUPTHER

## 2022-08-24 RX ORDER — ISOSORBIDE MONONITRATE 30 MG/1
30 TABLET, EXTENDED RELEASE ORAL DAILY
Status: DISCONTINUED | OUTPATIENT
Start: 2022-08-24 | End: 2022-08-24 | Stop reason: HOSPADM

## 2022-08-24 RX ADMIN — TAMSULOSIN HYDROCHLORIDE 0.4 MG: 0.4 CAPSULE ORAL at 08:48

## 2022-08-24 RX ADMIN — SODIUM CHLORIDE, PRESERVATIVE FREE 10 ML: 5 INJECTION INTRAVENOUS at 07:26

## 2022-08-24 RX ADMIN — TICAGRELOR 90 MG: 90 TABLET ORAL at 08:49

## 2022-08-24 RX ADMIN — ISOSORBIDE MONONITRATE 30 MG: 30 TABLET, EXTENDED RELEASE ORAL at 09:01

## 2022-08-24 RX ADMIN — TICAGRELOR 90 MG: 90 TABLET ORAL at 01:37

## 2022-08-24 RX ADMIN — AMLODIPINE BESYLATE 5 MG: 5 TABLET ORAL at 08:49

## 2022-08-24 RX ADMIN — METOPROLOL SUCCINATE 100 MG: 100 TABLET, EXTENDED RELEASE ORAL at 08:48

## 2022-08-24 RX ADMIN — ASPIRIN 81 MG CHEWABLE TABLET 81 MG: 81 TABLET CHEWABLE at 08:48

## 2022-08-24 RX ADMIN — PANTOPRAZOLE SODIUM 20 MG: 20 TABLET, DELAYED RELEASE ORAL at 08:49

## 2022-08-24 NOTE — PROGRESS NOTES
D/C order noted for today. Orders reviewed. Pt has order for cardiac rehab and he is in agreement with this plan.               PARISA Forbes RN  Care Management  Pager: [phone]

## 2022-08-24 NOTE — PROGRESS NOTES
conducted an initial consultation and Spiritual Assessment for Janeth Cuellar III, who is a 74 y. o.,male. Patients Primary Language is: [place]. According to the patients EMR Pentecostal Affiliation is: Worship.     The reason the Patient came to the hospital is:   Patient Active Problem List    Diagnosis Date Noted    CAD (coronary artery disease) 08/23/2022    STEMI (ST elevation myocardial infarction) ([facility] [address] 12/02/2015    Hiatal hernia 09/08/2017    Chest pain 02/05/2016    S/P right coronary artery (RCA) stent placement 12/08/2015    Coronary artery disease involving native coronary artery of native heart with unstable angina pectoris ([facility] [address] 12/08/2015    ST elevation myocardial infarction (STEMI) involving other coronary artery of inferior wall [facility] 12/02/2015    HTN (hypertension) 12/02/2015    Dyslipidemia, goal LDL below 70 12/02/2015    Osteoarthritis of right hip 06/08/2014    Failed total hip arthroplasty ([facility] 06/08/2014    Paraesophageal hernia 04/30/2013        The  provided the following Interventions:  Initiated a relationship of care and support with patient in room 2315 today. Listened empathically as patient talked about his history of cardiac issues and his having been here many times in the past. Patient reported that his advance directive was at the house and not on file here. Copy to be had later he hopes. Provided information about Spiritual Care Services. Offered prayer and assurance of continued prayers on patients behalf. The following outcomes were achieved:  Patient shared limited information about his medical narrative and spiritual journey/beliefs. Patient processed feeling about current hospitalization. Patient expressed gratitude for pastoral care visit. Assessment:  Patient does not have any Anglican/cultural needs that will affect patients preferences in health care.   There are no further spiritual or Anglican issues which require Spiritual Care Services interventions at this time. Plan:  Chaplains will continue to follow and will provide pastoral care on an as needed/requested basis    . Aaron Herron   Spiritual Care   [phone]

## 2022-08-24 NOTE — ACP (ADVANCE CARE PLANNING)
Advance Care Planning   Advance Care Planning Inpatient Note  [address] [facility] Department    Today's Date: 8/24/2022  Unit: [facility] 2 CV STEPDOWN    Received request from patient. Upon review of chart and communication with care team, patient's decision making abilities are not in question. Patient was/were present in the room during visit. Goals of ACP Conversation:  Discuss Advance Care planning documents    Health Care Decision Makers:    No healthcare decision makers have been documented. Click here to complete [address] including selection of the Healthcare Decision Maker Relationship (ie \"Primary\")  Summary:      Advance Care Planning Documents (Patient Wishes) on file:       Assessment:    Patient stated that he has an advance directive at the house but not here on records. Patient said he will try and get a family member to bring in a copy. Interventions:      Care Preferences Communicated:       Outcomes/Plan:      AMY Mojica on 8/24/2022 at 11:35 AM

## 2022-08-24 NOTE — PROGRESS NOTES
Reason for Admission:  Coronary artery disease, unspecified vessel or lesion type, unspecified whether angina present, unspecified whether native or transplanted heart [I25.10]  Unstable angina (HCC) [I20.0]  CAD (coronary artery disease) [I25.10]                 RUR Score:    N/A            Plan for utilizing home health:    no                      Likelihood of Readmission:   LOW                         Transition of Care Plan:              Initial assessment completed with patient. Cognitive status of patient: oriented to time, place, person and situation. Face sheet information confirmed:  yes. The patient designates his wife Erlinda Strange to participate in his discharge plan and to receive any needed information. This patient lives in a home with wife. Patient is able to navigate steps as needed. Prior to hospitalization, patient was considered to be independent with ADLs/IADLS : yes . Patient has a current ACP document on file: no      Healthcare Decision Maker:     Click here to complete [address] including selection of the Healthcare Decision Maker Relationship (ie \"Primary\")    The wife will be available to transport patient home upon discharge. The patient already has none reported, medical equipment available in the home. Patient is not currently active with home health. Patient has not stayed in a skilled nursing facility or rehab. Was  stay within last 60 days : no. This patient is on dialysis :no  Currently, the discharge plan is Home with cardiac rehab. The patient states that he can obtain his medications from the pharmacy, and take his medications as directed. Patient's current insurance is [facility] Management Interventions  PCP Verified by CM: Yes  Palliative Care Criteria Met (RRAT>21 & CHF Dx)?: No  Mode of Transport at Discharge:  Other (see comment) (family)  Transition of Care Consult (CM Consult): Discharge Planning  Support Systems: Spouse/Significant Other  Confirm Follow Up Transport: Family  Discharge Location  Patient Expects to be Discharged to[de-identified] Home with outpatient services        PARISA De La Vega RN  Care Management  Pager: [phone]

## 2022-08-24 NOTE — PROGRESS NOTES
Problem: Falls - Risk of  Goal: *Absence of Falls  Description: Document Kelli Butler Fall Risk and appropriate interventions in the flowsheet.   Outcome: Progressing Towards Goal  Note: Fall Risk Interventions:  Mobility Interventions: Assess mobility with egress test, Bed/chair exit alarm         Medication Interventions: Assess postural VS orthostatic hypotension, Bed/chair exit alarm    Elimination Interventions: Bed/chair exit alarm, Call light in reach              Problem: Patient Education: Go to Patient Education Activity  Goal: Patient/Family Education  Outcome: Progressing Towards Goal

## 2022-08-24 NOTE — PROGRESS NOTES
Received Brilinta prescription and 1 other discharge prescription to outpatient pharmacy. Patients has COPAY of $1.39. Will deliver when ready.

## 2022-08-24 NOTE — DISCHARGE SUMMARY
Cardiology Discharge Summary      Discharge Summary     Patient: Joy Hammonds MRN: [id number]  SSN: [id number]    YOB: 1955  Age: 74 y.o.   Sex: male       Admit Date: 8/23/2022    Discharge Date: 8/24/2022      Admission Diagnoses: Coronary artery disease, unspecified vessel or lesion type, unspecified whether angina present, unspecified whether native or transplanted heart [I25.10]  Unstable angina (HCC) [I20.0]  CAD (coronary artery disease) [I25.10]    Discharge Diagnoses:   Problem List as of 8/24/2022 Date Reviewed: 8/23/2022            Codes Class Noted - Resolved    CAD (coronary artery disease) ICD-10-CM: I25.10  ICD-9-CM: 414.00  8/23/2022 - Present        STEMI (ST elevation myocardial infarction) ([facility] ICD-10-CM: I21.3  ICD-9-CM: 410.90  12/2/2015 - Present        Hiatal hernia ICD-10-CM: K44.9  ICD-9-CM: 553.3  9/8/2017 - Present        Chest pain ICD-10-CM: R07.9  ICD-9-CM: 786.50  2/5/2016 - Present        S/P right coronary artery (RCA) stent placement ICD-10-CM: Z95.5  ICD-9-CM: V45.82  12/8/2015 - Present        * (Principal) Coronary artery disease involving native coronary artery of native heart with unstable angina pectoris ([facility] ICD-10-CM: I25.110  ICD-9-CM: 414.01, 411.1  12/8/2015 - Present        ST elevation myocardial infarction (STEMI) involving other coronary artery of inferior wall ([facility] ICD-10-CM: I21.19  ICD-9-CM: 410.40  12/2/2015 - Present        HTN (hypertension) ICD-10-CM: I10  ICD-9-CM: 401.9  12/2/2015 - Present        Dyslipidemia, goal LDL below 70 ICD-10-CM: E78.5  ICD-9-CM: 272.4  12/2/2015 - Present        Osteoarthritis of right hip (Chronic) ICD-10-CM: M16.11  ICD-9-CM: 715.95  6/8/2014 - Present        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 male, who presented for elective heart catheterization. He had  mid to distal LAD PCI and stenting via right radial approach without any complications. Patient will be discharged home on ASA (lifelong) and Brilinta (one year). Consults: None    Significant Diagnostic Studies: angiography:   Patent stents in proximal and distal RCA from 2015.  80% mid RCA stenosis which was not flow-limiting and had IFR of 0.92 and was not intervened. 70% mid LAD stenosis 2 separate focal locations with IFR of 0.82 which was stented with YAJAIRA successfully with residual 0% stenosis and IFR of 0.92  100% proximal circumflex stenosis with left to left collaterals opacifying the small marginal branch. Procedure done via right radial artery without any complications. Visually RCA lesion appeared significant but IFR was normal and so it was not intervened. LAD lesion appeared borderline at best but had critical IFR and so drug-eluting stent was done in mid to distal LAD. Clifford Founds A single 30 mm stent covered both locations. Excellent angiographic results were obtained. Aggressive medical treatment and risk factor modification to continue. Disposition: home    Discharge Medications:      My Medications        START taking these medications        Instructions Each Dose to Equal Morning Noon Evening Bedtime   * ticagrelor 90 mg tablet  Commonly known as: BRILINTA    Your last dose was: Your next dose is: Take 1 Tablet by mouth two (2) times a day. 90 mg                 * ticagrelor 90 mg tablet  Commonly known as: BRILINTA    Your last dose was: Your next dose is: Take 1 Tablet by mouth two (2) times a day. Indications: blood clot prevention following percutaneous coronary intervention   90 mg                       * This list has 2 medication(s) that are the same as other medications prescribed for you.  Read the directions carefully, and ask your doctor or other care provider to review them with you. CONTINUE taking these medications        Instructions Each Dose to Equal Morning Noon Evening Bedtime   amLODIPine 5 mg tablet  Commonly known as: NORVASC    Your last dose was: Your next dose is:         TAKE 1 TABLET BY MOUTH EVERY DAY                  aspirin 81 mg chewable tablet    Your last dose was: Your next dose is: Take 1 Tab by mouth daily. 81 mg                 esomeprazole 40 mg capsule  Commonly known as: [address]    Your last dose was: Your next dose is: Take 22.3 mg by mouth as needed. 22.3 mg                 isosorbide mononitrate ER 30 mg tablet  Commonly known as: IMDUR    Your last dose was: Your next dose is: Take 1 Tablet by mouth every morning. Indications: prevention of anginal chest pain associated with coronary artery disease   30 mg                 metoprolol succinate 100 mg tablet  Commonly known as: TOPROL-XL    Your last dose was: Your next dose is: Take 100 mg by mouth in the morning. 100 mg                 nitroglycerin 0.4 mg SL tablet  Commonly known as: NITROSTAT    Your last dose was: Your next dose is:         PLACE ONE TABLET UNDER TONGUE EVERY 5 MINUTES AS NEEDED FOR CHEST PAIN. MAXIMUM OF 3 TABLETS. IF NO RELIEF, CALL 911                  rosuvastatin 20 mg tablet  Commonly known as: CRESTOR    Your last dose was: Your next dose is: Take 20 mg by mouth nightly. 20 mg                 tamsulosin 0.4 mg capsule  Commonly known as: FLOMAX    Your last dose was: Your next dose is: Take 0.4 mg by mouth in the morning.    0.4 mg                           Where to Get Your Medications        These medications were sent to [address] [place] [id number]      Phone: [phone]   ticagrelor 90 mg tablet       These medications were sent to [facility] [address], [address] [place] [id number]      Phone: [phone]   isosorbide mononitrate ER 30 mg tablet  ticagrelor 90 mg tablet         Activity: Activity as tolerated  Diet: Regular Diet  Wound Care: As directed    No orders of the defined types were placed in this encounter. I saw, evaluated, interviewed and examined the patient personally. Patient had a cardiac cath yesterday and LAD intervention was performed. Cardiac cath findings noted. Without any significant chest pain concerning for angina this morning. Imdur has been started. Importance of blood onset has been discussed with the patient. Continue cardiac medication as mentioned above. Significant time spent in reviewing the case, multiple EMR databases, physician notes, reviewing pertinent labs and imaging studies  I spent significant amount of time for medical decision making and updated history, and other providers assessments as well. I personally agree with the findings as stated and the plan as documented.   I saw, examined, and evaluated this patient and performed the substantive portion of the encounter for > 50% of the time including extensive history, physical exam, assessment and plan    Veda Mckenzie MD         Signed By: Kim Rodriguez PA-C     August 24, 2022

## 2022-08-24 NOTE — PROGRESS NOTES
Bedside and verbal shift report given to Conard Oppenheim (oncoming nurse) by Luca Alexis (off going nurse). Report included the following information SBAR, Intake/Output, Mar and Cardiac rhythm. Wound Prevention Checklist    Patient: Caryle Bevel III (84 y.o. male)  Date: 8/24/2022  Diagnosis: Coronary artery disease, unspecified vessel or lesion type, unspecified whether angina present, unspecified whether native or transplanted heart [I25.10]  Unstable angina (HCC) [I20.0]  CAD (coronary artery disease) [I25.10] Coronary artery disease involving native coronary artery of native heart with unstable angina pectoris ([facility]    Precautions:         []  Heel prevention boots placed on patient    []  Patient turned q2h during shift    []  Lift team ordered    [x]  Patient on [place] bed/Specialty bed    []  Each Wound is documented during shift (Stage, Color, drainage, odor, measurements, and dressings)    [x]  Dual skin checks done at bedside during shift report with [facility], RN    Margoth Cortez RN     0700: Bedside and verbal shift report given to RUBEN Gonzales (oncoming nurse) by Jaime Galvez (off going nurse). Report included the following information SBAR, Intake/Output, Mar and Cardiac rhythm. 6707: Post cath EKG done. In Patient's chart.

## 2022-08-24 NOTE — DISCHARGE INSTRUCTIONS

## 2022-08-24 NOTE — PROGRESS NOTES
Discharge instruction given to patient. Verbalized understanding. Opportunity for questions and clarification provided. All questions answered. Pt left unit without any distress. All belongings with patient.  Accompanied by spouse

## 2022-08-24 NOTE — PROGRESS NOTES
Problem: Falls - Risk of  Goal: *Absence of Falls  Description: Document Feliciano Cease Fall Risk and appropriate interventions in the flowsheet.   Outcome: Progressing Towards Goal  Note: Fall Risk Interventions:  Mobility Interventions: Patient to call before getting OOB         Medication Interventions: Patient to call before getting OOB    Elimination Interventions: Call light in reach

## 2022-08-24 NOTE — PROGRESS NOTES
Wound Prevention Checklist    Patient: John Glaser III (89 y.o. male)  Date: 8/24/2022  Diagnosis: Coronary artery disease, unspecified vessel or lesion type, unspecified whether angina present, unspecified whether native or transplanted heart [I25.10]  Unstable angina (HCC) [I20.0]  CAD (coronary artery disease) [I25.10] Coronary artery disease involving native coronary artery of native heart with unstable angina pectoris ([facility]    Precautions:         []  Heel prevention boots placed on patient    []  Patient turned q2h during shift    []  Lift team ordered    []  Patient on [place] bed/Specialty bed    [x]  Each Wound is documented during shift (Stage, Color, drainage, odor, measurements, and dressings)    [x]  Dual skin checks done at bedside during shift report with RUBEN Boyd RN

## 2022-09-21 RX ORDER — ISOSORBIDE MONONITRATE 30 MG/1
30 TABLET, EXTENDED RELEASE ORAL
Qty: 30 TABLET | Refills: 3 | Status: SHIPPED | OUTPATIENT
Start: 2022-09-21

## 2022-09-21 NOTE — TELEPHONE ENCOUNTER
Requested Prescriptions     Pending Prescriptions Disp Refills    isosorbide mononitrate ER (IMDUR) 30 mg tablet 30 Tablet 3     Sig: Take 1 Tablet by mouth every morning.  Indications: prevention of anginal chest pain associated with coronary artery disease

## 2022-09-28 ENCOUNTER — OFFICE VISIT (OUTPATIENT)
Dept: CARDIOLOGY CLINIC | Age: 67
End: 2022-09-28
Payer: COMMERCIAL

## 2022-09-28 VITALS
WEIGHT: 221 LBS | SYSTOLIC BLOOD PRESSURE: 135 MMHG | HEART RATE: 68 BPM | OXYGEN SATURATION: 97 % | DIASTOLIC BLOOD PRESSURE: 93 MMHG | HEIGHT: 69 IN | BODY MASS INDEX: 32.73 KG/M2

## 2022-09-28 DIAGNOSIS — I25.10 CORONARY ARTERY DISEASE INVOLVING NATIVE CORONARY ARTERY OF NATIVE HEART WITHOUT ANGINA PECTORIS: ICD-10-CM

## 2022-09-28 DIAGNOSIS — Z95.5 H/O RIGHT CORONARY ARTERY STENT PLACEMENT: ICD-10-CM

## 2022-09-28 DIAGNOSIS — I10 HYPERTENSION, UNSPECIFIED TYPE: ICD-10-CM

## 2022-09-28 DIAGNOSIS — E78.5 DYSLIPIDEMIA, GOAL LDL BELOW 70: ICD-10-CM

## 2022-09-28 DIAGNOSIS — I20.0 UNSTABLE ANGINA PECTORIS (HCC): Primary | ICD-10-CM

## 2022-09-28 PROCEDURE — 1123F ACP DISCUSS/DSCN MKR DOCD: CPT | Performed by: NURSE PRACTITIONER

## 2022-09-28 PROCEDURE — 99214 OFFICE O/P EST MOD 30 MIN: CPT | Performed by: NURSE PRACTITIONER

## 2022-09-28 RX ORDER — AMLODIPINE BESYLATE 10 MG/1
10 TABLET ORAL DAILY
Qty: 90 TABLET | Refills: 3 | Status: SHIPPED | OUTPATIENT
Start: 2022-09-28

## 2022-09-28 NOTE — PROGRESS NOTES
HISTORY OF PRESENT ILLNESS  Leatha Peres III is a 74 y.o. male. 8/2022  Patient presents due to recent elevated b/p and episodes of chest pressure with radiation to neck. He denies shortness of breath, palpitations or edema. 8/16/2022  Patient presents to follow-up for stress test results. 9/2022  Patient presents s/p cardiac catheterization with stent placed to LAD. He reports chest pain has resolved, he c/o shortness of breath, initially after stent was placed, which has improved. He denies palpitations or edema. Shortness of Breath  The history is provided by the Patient. This is a chronic problem. The problem occurs rarely. The current episode started more than 1 week ago. The problem has not changed since onset. Pertinent negatives include no neck pain, no cough, no wheezing, no PND, no orthopnea, no chest pain, no vomiting, no leg swelling and no claudication. Associated medical issues include CAD and past MI. Associated medical issues do not include heart failure. Follow-up  The history is provided by the Patient and medical records. Associated symptoms include shortness of breath. Pertinent negatives include no chest pain. Review of Systems   Constitutional:  Negative for malaise/fatigue. HENT:  Negative for congestion. Eyes:  Negative for double vision and redness. Respiratory:  Positive for shortness of breath. Negative for cough and wheezing. Cardiovascular:  Negative for chest pain, palpitations, orthopnea, claudication, leg swelling and PND. Gastrointestinal:  Negative for nausea and vomiting. Musculoskeletal:  Negative for falls and neck pain. Neurological:  Negative for dizziness. Endo/Heme/Allergies:  Does not bruise/bleed easily.    Family History   Problem Relation Age of Onset    Colon Polyps Mother     Stroke Father     Hypertension Father     Cancer Brother        Past Medical History:   Diagnosis Date    Acid reflux     Failed total hip arthroplasty ([facility] 6/8/2014    GERD (gastroesophageal reflux disease)     Hypertension 2007    Ill-defined condition 12/2015    Echo EF 50%    OA (osteoarthritis)     Osteoarthritis of right hip 6/8/2014    Other ill-defined conditions(799.89)     hiatia hernia    Psychiatric disorder     S/P coronary artery stent placement     STEMI (ST elevation myocardial infarction) ([facility] 12/02/2015    RCA stent       Past Surgical History:   Procedure Laterality Date    HX CORONARY STENT PLACEMENT Right 2015    right coronary     HX HIP REPLACEMENT      left hip replacement about 18 yrs. ago. HX KNEE REPLACEMENT  6-04-12    right knee replacemtnt    HX OTHER SURGICAL      bilateral hip replacement    HX TONSILLECTOMY         Social History     Tobacco Use    Smoking status: Never    Smokeless tobacco: Never   Substance Use Topics    Alcohol use: Yes     Alcohol/week: 0.0 standard drinks     Types: 6 - 12 Cans of beer per week       Allergies   Allergen Reactions    Celebrex [Celecoxib] Other (comments)     Have hiatal hernia, bleeding concerns       Outpatient Medications Marked as Taking for the 9/28/22 encounter (Office Visit) with Yolanda Aguirre NP   Medication Sig Dispense Refill    amLODIPine (NORVASC) 10 mg tablet Take 1 Tablet by mouth daily. 90 Tablet 3    isosorbide mononitrate ER (IMDUR) 30 mg tablet Take 1 Tablet by mouth every morning. Indications: prevention of anginal chest pain associated with coronary artery disease 30 Tablet 3    ticagrelor (BRILINTA) 90 mg tablet Take 1 Tablet by mouth two (2) times a day. 60 Tablet 3    ticagrelor (BRILINTA) 90 mg tablet Take 1 Tablet by mouth two (2) times a day. Indications: blood clot prevention following percutaneous coronary intervention 60 Tablet 0    metoprolol succinate (TOPROL-XL) 100 mg tablet Take 100 mg by mouth in the morning. nitroglycerin (NITROSTAT) 0.4 mg SL tablet PLACE ONE TABLET UNDER TONGUE EVERY 5 MINUTES AS NEEDED FOR CHEST PAIN. MAXIMUM OF 3 TABLETS.  IF NO RELIEF, CALL 911      tamsulosin (FLOMAX) 0.4 mg capsule Take 0.4 mg by mouth in the morning. rosuvastatin (CRESTOR) 20 mg tablet Take 40 mg by mouth nightly. aspirin 81 mg chewable tablet Take 1 Tab by mouth daily. 30 Tab 6    esomeprazole (NEXIUM) 40 mg capsule Take 22.3 mg by mouth as needed. Visit Vitals  BP (!) 135/93   Pulse 68   Ht 5' 9\" (1.753 m)   Wt 100.2 kg (221 lb)   SpO2 97%   BMI 32.64 kg/m²         Physical Exam  Vitals and nursing note reviewed. Constitutional:       General: He is not in acute distress. Appearance: He is well-developed. He is obese. He is not diaphoretic. HENT:      Head: Atraumatic. Mouth/Throat:      Pharynx: No oropharyngeal exudate. Eyes:      General: No scleral icterus. Conjunctiva/sclera: Conjunctivae normal.   Neck:      Vascular: No JVD. Cardiovascular:      Rate and Rhythm: Normal rate and regular rhythm. Heart sounds: No murmur heard. No gallop. Comments: Right wrist cath access site intact no hematoma. Palpable radial pulse  Pulmonary:      Effort: Pulmonary effort is normal.      Breath sounds: Normal breath sounds. No stridor. No wheezing or rales. Abdominal:      Palpations: Abdomen is soft. Tenderness: There is no abdominal tenderness. There is no rebound. Musculoskeletal:         General: No tenderness. Normal range of motion. Cervical back: Neck supple. Right lower leg: No edema. Left lower leg: No edema. Skin:     General: Skin is warm. Neurological:      Mental Status: He is alert and oriented to person, place, and time. Motor: No abnormal muscle tone. Psychiatric:         Behavior: Behavior normal.     8/15/2022  Nuclear stress test  Interpretation Summary       Nuclear Findings: Abnormal left ventricular systolic function post-stress. LVEF measures 35%. Nuclear Findings: LVEF measures 35%.  There is a moderate severity left ventricular stress perfusion defect that is medium to large in size present in the basal to mid inferior and inferolateral segment(s) that is reversible. The defect is consistent with abnormal perfusion in the LCx or RCA territories. Viability in the area is moderate. There is normal wall motion in the defect area. The defect appears to be jennifer-infarct ischemia. Nuclear Findings: Findings suggest a moderate risk of myocardial ischemia. LVEF measures 35%. Nuclear Findings: LVEF measures 35%. There is evidence of inducible ischemia. ECG: Resting ECG demonstrates sinus bradycardia and first-degree AV block. ECG: Stress ECG was negative for ischemia. Stress Test: A pharmacological stress test was performed using lexiscan. Hemodynamics are adequate for diagnosis. Blood pressure demonstrated a normal response and heart rate demonstrated a blunted response to stress. The patient's heart rate recovery was normal. The patient reported no symptoms during the stress test.    8/17/2022  Echo   Interpretation Summary         Left Ventricle: Mildly reduced left ventricular systolic function with a visually estimated EF of 45 - 50%. Left ventricle size is normal. Increased wall thickness. Findings consistent with mild concentric hypertrophy. See diagram for wall motion findings. Mitral Valve: Mild to moderate regurgitation. Left Atrium: Left atrium is mildly dilated. LA Vol Index A/L is 41 mL/m2. Pulmonary Arteries: Pulmonary hypertension not present. Aorta: Mildly dilated aortic root. Ao Root diameter is 3.7 cm. Comparison Study Information    Prior Study    There is a prior study available for comparison. Prior study date: 4/2/2020. As compared to the previous study, there are significant changes. LV function has decreased. 8/2022  Cardiac cath  Conclusion       Patent stents in proximal and distal RCA from 2015.  80% mid RCA stenosis which was not flow-limiting and had IFR of 0.92 and was not intervened.   70% mid LAD stenosis 2 separate focal locations with IFR of 0.82 which was stented with YAJAIRA successfully with residual 0% stenosis and IFR of 0.92  100% proximal circumflex stenosis with left to left collaterals opacifying the small marginal branch. Procedure done via right radial artery without any complications. Visually RCA lesion appeared significant but IFR was normal and so it was not intervened. LAD lesion appeared borderline at best but had critical IFR and so drug-eluting stent was done in mid to distal LAD. Ladarius Melissa A single 30 mm stent covered both locations. Excellent angiographic results were obtained. Aggressive medical treatment and risk factor modification to continue.  ekg sinus rhythm with old inferior infarct age undetermined    ASSESSMENT and PLAN    ICD-10-CM ICD-9-CM    1. Unstable angina pectoris (HCC)  I20.0 411.1 amLODIPine (NORVASC) 10 mg tablet      2. Coronary artery disease involving native coronary artery of native heart without angina pectoris  I25.10 414.01       3. H/O right coronary artery stent placement  Z95.5 V45.82       4. Hypertension, unspecified type  I10 401.9 amLODIPine (NORVASC) 10 mg tablet      5. Dyslipidemia, goal LDL below 70  E78.5 272.4         Orders Placed This Encounter    amLODIPine (NORVASC) 10 mg tablet     Sig: Take 1 Tablet by mouth daily. Dispense:  90 Tablet     Refill:  3         current treatment plan is effective, no change in therapy  cardiovascular risk and specific lipid/LDL goals reviewed  use of aspirin to prevent MI and TIA's discussed. Patient with recent inferior wall MI- s/p RCA stent- also has residual moderate to severe LAd stenosis-   Currently on optimal medical therapy with no significant angina or dyspnea. Will obtain lipid panel report from his PCP. Continue aspirin 81 mg indefinitely. Can proceed to planned endoscopy/colonoscopy with low cardiac risk.   Okay to hold aspirin 5 to 7 days prior to procedure and resume it postop as soon as possible. 8/2022  Patient with history of CAD with RCA stent and moderate to severe LAD stenosis. Blood pressure is controlled with current medications. Will obtain nuclear stress test to rule out ischemia. Recent lipid panel reviewed and discussed with patient LDL 67. Continue statin, aspirin, bb and amlodipine. 8/16/2022  Patient with history of CAD with stents to RCA. Seen in follow-up for stress test results. Nuclear stress test reviewed and discussed with patient this is abnormal with perfusion defect in the left circumflex or RCA territories. Will obtain echocardiogram to follow LV function. Cardiac cath reviewed and discussed with patient he would like to proceed. Patient to continue medical therapy with beta-blocker, statin aspirin and amlodipine. Follow-up postprocedure  THE PATIENT UNDERSTANDS THAT ALTHOUGH RARE, SEVERE  UNEXPECTED COMPLICATIONS CAN OCCUR WITH EACH TYPE OF CARDIAC CATH PROCEDURE. THESE RISKS INCLUDE,  BUT ARE NOT LIMITED TO: ALLERGIC REACTION, INFECTION, BLEEDING, BLOOD VESSEL INJURY,   KIDNEY INJURY FROM X-RAY DYE, PUNCTURE OF THE HEART/LUNGS,   EMERGENT OPEN HEART SURGERY, HEART ATTACK, STROKE, CARDIAC  ARREST OR DEATH OR NEED FOR EMERGENCY CARDIAC BYPASS SURGERY   9/2022  Patient with history of CAD status postcardiac cath with stent placed to LAD, 80% mid RCA stenosis which was not flow-limiting and had IFR of 0.92 and was not intervened. Recommend intense risk factor modification, continued DAPT with Brilinta and aspirin, continue beta-blocker high intensity statin. Blood pressure mildly elevated in office today will increase amlodipine to 10 mg/day and follow home BP chart. Recommended cardiac rehab however patient has declined. Prior echo reviewed EF 45-50%, recommend low sodium diet and weight loss efforts.

## 2022-09-28 NOTE — PROGRESS NOTES
1. Have you been to the ER, urgent care clinic since your last visit? Hospitalized since your last visit?     no    2. Have you seen or consulted any other health care providers outside of the [address] [place] [address] since your last visit? Include any pap smears or colon screening.       No

## 2023-01-27 ENCOUNTER — OFFICE VISIT (OUTPATIENT)
Dept: CARDIOLOGY CLINIC | Age: 68
End: 2023-01-27
Payer: COMMERCIAL

## 2023-01-27 VITALS
OXYGEN SATURATION: 96 % | WEIGHT: 217 LBS | BODY MASS INDEX: 32.14 KG/M2 | HEIGHT: 69 IN | DIASTOLIC BLOOD PRESSURE: 81 MMHG | HEART RATE: 66 BPM | SYSTOLIC BLOOD PRESSURE: 127 MMHG

## 2023-01-27 DIAGNOSIS — I10 BENIGN ESSENTIAL HTN: ICD-10-CM

## 2023-01-27 DIAGNOSIS — E66.9 OBESITY (BMI 30.0-34.9): ICD-10-CM

## 2023-01-27 DIAGNOSIS — R60.0 LOCALIZED EDEMA: ICD-10-CM

## 2023-01-27 DIAGNOSIS — Z95.5 H/O RIGHT CORONARY ARTERY STENT PLACEMENT: ICD-10-CM

## 2023-01-27 DIAGNOSIS — I25.10 CORONARY ARTERY DISEASE INVOLVING NATIVE CORONARY ARTERY OF NATIVE HEART WITHOUT ANGINA PECTORIS: Primary | ICD-10-CM

## 2023-01-27 DIAGNOSIS — E78.5 DYSLIPIDEMIA, GOAL LDL BELOW 70: ICD-10-CM

## 2023-01-27 PROCEDURE — 99214 OFFICE O/P EST MOD 30 MIN: CPT | Performed by: INTERNAL MEDICINE

## 2023-01-27 PROCEDURE — 3074F SYST BP LT 130 MM HG: CPT | Performed by: INTERNAL MEDICINE

## 2023-01-27 PROCEDURE — 1123F ACP DISCUSS/DSCN MKR DOCD: CPT | Performed by: INTERNAL MEDICINE

## 2023-01-27 PROCEDURE — 3079F DIAST BP 80-89 MM HG: CPT | Performed by: INTERNAL MEDICINE

## 2023-01-27 RX ORDER — FUROSEMIDE 20 MG/1
20 TABLET ORAL DAILY
COMMUNITY

## 2023-01-27 RX ORDER — CLOPIDOGREL BISULFATE 75 MG/1
75 TABLET ORAL DAILY
Qty: 30 TABLET | Refills: 6 | Status: SHIPPED | OUTPATIENT
Start: 2023-01-27

## 2023-01-27 RX ORDER — ISOSORBIDE MONONITRATE 30 MG/1
30 TABLET, EXTENDED RELEASE ORAL
Qty: 30 TABLET | Refills: 3 | Status: SHIPPED | OUTPATIENT
Start: 2023-01-27

## 2023-01-27 NOTE — PATIENT INSTRUCTIONS
Medications Discontinued During This Encounter   Medication Reason    ticagrelor (BRILINTA) 90 mg tablet Alternate Therapy    ticagrelor (BRILINTA) 90 mg tablet Alternate Therapy    isosorbide mononitrate ER (IMDUR) 30 mg tablet REORDER         Learning About the Mediterranean Diet  What is the Mediterranean diet? The Mediterranean diet is a style of eating rather than a diet plan. It features foods eaten in [place], [place], [place] and [place], and other countries along the [facility]. It emphasizes eating foods like fish, fruits, vegetables, beans, high-fiber breads and whole grains, nuts, and olive oil. This style of eating includes limited red meat, cheese, and sweets. Why choose the Mediterranean diet? A Mediterranean-style diet may improve heart health. It contains more fat than other heart-healthy diets. But the fats are mainly from nuts, unsaturated oils (such as fish oils and olive oil), and certain nut or seed oils (such as canola, soybean, or flaxseed oil). These fats may help protect the heart and blood vessels. How can you get started on the Mediterranean diet? Here are some things you can do to switch to a more Mediterranean way of eating. What to eat  Eat a variety of fruits and vegetables each day, such as grapes, blueberries, tomatoes, broccoli, peppers, figs, olives, spinach, eggplant, beans, lentils, and chickpeas. Eat a variety of whole-grain foods each day, such as oats, brown rice, and whole wheat bread, pasta, and couscous. Eat fish at least 2 times a week. Try tuna, salmon, mackerel, lake trout, herring, or sardines. Eat moderate amounts of low-fat dairy products, such as milk, cheese, or yogurt. Eat moderate amounts of poultry and eggs. Choose healthy (unsaturated) fats, such as nuts, olive oil, and certain nut or seed oils like canola, soybean, and flaxseed. Limit unhealthy (saturated) fats, such as butter, palm oil, and coconut oil.  And limit fats found in animal products, such as meat and dairy products made with whole milk. Try to eat red meat only a few times a month in very small amounts. Limit sweets and desserts to only a few times a week. This includes sugar-sweetened drinks like soda. The Mediterranean diet may also include red wine with your meal--1 glass each day for women and up to 2 glasses a day for men. Tips for eating at home  Use herbs, spices, garlic, lemon zest, and citrus juice instead of salt to add flavor to foods. Add avocado slices to your sandwich instead of hernandez. Have fish for lunch or dinner instead of red meat. Brush the fish with olive oil, and broil or grill it. Sprinkle your salad with seeds or nuts instead of cheese. Cook with olive or canola oil instead of butter or oils that are high in saturated fat. Switch from 2% milk or whole milk to 1% or fat-free milk. Dip raw vegetables in a vinaigrette dressing or hummus instead of dips made from mayonnaise or sour cream.  Have a piece of fruit for dessert instead of a piece of cake. Try baked apples, or have some dried fruit. Tips for eating out  Try broiled, grilled, baked, or poached fish instead of having it fried or breaded. Ask your  to have your meals prepared with olive oil instead of butter. Order dishes made with marinara sauce or sauces made from olive oil. Avoid sauces made from cream or mayonnaise. Choose whole-grain breads, whole wheat pasta and pizza crust, brown rice, beans, and lentils. Cut back on butter or margarine on bread. Instead, you can dip your bread in a small amount of olive oil. Ask for a side salad or grilled vegetables instead of french fries or chips. Where can you learn more? Go to http://www.[employer].com/  Enter O407 in the search box to learn more about \"Learning About the Mediterranean Diet. \"  Current as of: May 9, 2022               Content Version: 13.4  © 6057-2260 [employer].    Care instructions adapted under license by [employer] (which disclaims liability or warranty for this information). If you have questions about a medical condition or this instruction, always ask your healthcare professional. [address] any warranty or liability for your use of this information.

## 2023-01-27 NOTE — PROGRESS NOTES
1. Have you been to the ER, urgent care clinic since your last visit? Hospitalized since your last visit?     no    2. Have you seen or consulted any other health care providers outside of the [address] [place] [address] since your last visit? Include any pap smears or colon screening. Yes Where: Dr. Danny Villegas      3. Since your last visit, have you had any of the following symptoms? no       4. Have you had any blood work, X-rays or cardiac testing? No     5. Where do you normally have your labs drawn? [facility] or pcp     6. Do you need any refills today?    yes

## 2023-01-27 NOTE — PROGRESS NOTES
HISTORY OF PRESENT ILLNESS  Tahira Ching III is a 74 y.o. male. 8/2022  Patient presents due to recent elevated b/p and episodes of chest pressure with radiation to neck. He denies shortness of breath, palpitations or edema. 8/16/2022  Patient presents to follow-up for stress test results. 9/2022  Patient presents s/p cardiac catheterization with stent placed to LAD. He reports chest pain has resolved, he c/o shortness of breath, initially after stent was placed, which has improved. He denies palpitations or edema. Shortness of Breath  The history is provided by the Patient. This is a chronic problem. The average episode lasts 3 minutes. The problem occurs rarely. The current episode started more than 1 week ago. The problem has not changed since onset. Associated symptoms include leg swelling. Pertinent negatives include no neck pain, no cough, no wheezing, no PND, no orthopnea, no chest pain, no vomiting and no claudication. The problem's precipitants include exercise (1/23 walking on hills/farm;). Associated medical issues include CAD and past MI. Associated medical issues do not include heart failure. Follow-up  The history is provided by the Patient and medical records. Associated symptoms include shortness of breath. Pertinent negatives include no chest pain. Leg Swelling  The history is provided by the Patient. This is a new problem. The current episode started more than 1 week ago (12/22). The problem occurs every several days. Associated symptoms include shortness of breath. Pertinent negatives include no chest pain. Exacerbated by: knee sleeve. Review of Systems   Constitutional:  Negative for malaise/fatigue. HENT:  Negative for congestion. Eyes:  Negative for double vision and redness. Respiratory:  Positive for shortness of breath. Negative for cough and wheezing. Cardiovascular:  Positive for leg swelling.  Negative for chest pain, palpitations, orthopnea, claudication and PND.   Gastrointestinal:  Negative for nausea and vomiting. Musculoskeletal:  Negative for falls and neck pain. Neurological:  Negative for dizziness. Endo/Heme/Allergies:  Does not bruise/bleed easily. Family History   Problem Relation Age of Onset    Colon Polyps Mother     Stroke Father     Hypertension Father     Cancer Brother        Past Medical History:   Diagnosis Date    Acid reflux     [facility] 6/8/2014    GERD (gastroesophageal reflux disease)     Hypertension 2007    Ill-defined condition 12/2015    Echo EF 50%    OA (osteoarthritis)     Osteoarthritis of right hip 6/8/2014    Other ill-defined conditions(799.89)     hiatia hernia    Psychiatric disorder     S/P coronary artery stent placement     STEMI (ST elevation myocardial infarction) ([facility] 12/02/2015    RCA stent       Past Surgical History:   Procedure Laterality Date    HX CORONARY STENT PLACEMENT Right 2015    right coronary     HX HIP REPLACEMENT      left hip replacement about 18 yrs. ago. HX KNEE REPLACEMENT  6-04-12    right knee replacemtnt    HX OTHER SURGICAL      bilateral hip replacement    HX TONSILLECTOMY         Social History     Tobacco Use    Smoking status: Never    Smokeless tobacco: Never   Substance Use Topics    Alcohol use: Yes     Alcohol/week: 0.0 standard drinks     Types: 6 - 12 Cans of beer per week       Allergies   Allergen Reactions    Celebrex [Celecoxib] Other (comments)     Have hiatal hernia, bleeding concerns       Outpatient Medications Marked as Taking for the 1/27/23 encounter (Office Visit) with Aline Bingham MD   Medication Sig Dispense Refill    furosemide (LASIX) 20 mg tablet Take 20 mg by mouth daily. DIETARY SUPPLEMENT PO Take  by mouth. Prevagen      isosorbide mononitrate ER (IMDUR) 30 mg tablet Take 1 Tablet by mouth every morning.  Indications: prevention of anginal chest pain associated with coronary artery disease 30 Tablet 3    clopidogreL (Plavix) 75 mg tab Take 1 Tablet by mouth daily. 30 Tablet 6    amLODIPine (NORVASC) 10 mg tablet Take 1 Tablet by mouth daily. 90 Tablet 3    metoprolol succinate (TOPROL-XL) 100 mg tablet Take 100 mg by mouth in the morning. nitroglycerin (NITROSTAT) 0.4 mg SL tablet PLACE ONE TABLET UNDER TONGUE EVERY 5 MINUTES AS NEEDED FOR CHEST PAIN. MAXIMUM OF 3 TABLETS. IF NO RELIEF, CALL 911      tamsulosin (FLOMAX) 0.4 mg capsule Take 0.4 mg by mouth in the morning. rosuvastatin (CRESTOR) 40 mg tablet Take 40 mg by mouth nightly. aspirin 81 mg chewable tablet Take 1 Tab by mouth daily. 30 Tab 6    esomeprazole (NEXIUM) 40 mg capsule Take 22.3 mg by mouth as needed. Visit Vitals  /81   Pulse 66   Ht 5' 9\" (1.753 m)   Wt 98.4 kg (217 lb)   SpO2 96%   BMI 32.05 kg/m²         Physical Exam  Vitals and nursing note reviewed. Constitutional:       General: He is not in acute distress. Appearance: He is well-developed. He is obese. He is not diaphoretic. HENT:      Head: Atraumatic. Mouth/Throat:      Pharynx: No oropharyngeal exudate. Eyes:      General: No scleral icterus. Conjunctiva/sclera: Conjunctivae normal.   Neck:      Vascular: No JVD. Cardiovascular:      Rate and Rhythm: Normal rate and regular rhythm. Heart sounds: No murmur heard. No gallop. Comments: Right wrist cath access site intact no hematoma. Palpable radial pulse  Pulmonary:      Effort: Pulmonary effort is normal.      Breath sounds: Normal breath sounds. No stridor. No wheezing or rales. Abdominal:      Palpations: Abdomen is soft. Tenderness: There is no abdominal tenderness. There is no rebound. Musculoskeletal:         General: No tenderness. Normal range of motion. Cervical back: Neck supple. Right lower leg: No edema. Left lower leg: No edema. Skin:     General: Skin is warm. Neurological:      Mental Status: He is alert and oriented to person, place, and time. Motor: No abnormal muscle tone. Psychiatric:         Behavior: Behavior normal.     8/15/2022  Nuclear stress test  Interpretation Summary       Nuclear Findings: Abnormal left ventricular systolic function post-stress. LVEF measures 35%. Nuclear Findings: LVEF measures 35%. There is a moderate severity left ventricular stress perfusion defect that is medium to large in size present in the basal to mid inferior and inferolateral segment(s) that is reversible. The defect is consistent with abnormal perfusion in the LCx or RCA territories. Viability in the area is moderate. There is normal wall motion in the defect area. The defect appears to be jennifer-infarct ischemia. Nuclear Findings: Findings suggest a moderate risk of myocardial ischemia. LVEF measures 35%. Nuclear Findings: LVEF measures 35%. There is evidence of inducible ischemia. ECG: Resting ECG demonstrates sinus bradycardia and first-degree AV block. ECG: Stress ECG was negative for ischemia. Stress Test: A pharmacological stress test was performed using lexiscan. Hemodynamics are adequate for diagnosis. Blood pressure demonstrated a normal response and heart rate demonstrated a blunted response to stress. The patient's heart rate recovery was normal. The patient reported no symptoms during the stress test.    8/17/2022  Echo   Interpretation Summary         Left Ventricle: Mildly reduced left ventricular systolic function with a visually estimated EF of 45 - 50%. Left ventricle size is normal. Increased wall thickness. Findings consistent with mild concentric hypertrophy. See diagram for wall motion findings. Mitral Valve: Mild to moderate regurgitation. Left Atrium: Left atrium is mildly dilated. LA Vol Index A/L is 41 mL/m2. Pulmonary Arteries: Pulmonary hypertension not present. Aorta: Mildly dilated aortic root. Ao Root diameter is 3.7 cm.      Comparison Study Information    Prior Study    There is a prior study available for comparison. Prior study date: 4/2/2020. As compared to the previous study, there are significant changes. LV function has decreased. 8/2022  Cardiac cath  Conclusion       Patent stents in proximal and distal RCA from 2015.  80% mid RCA stenosis which was not flow-limiting and had IFR of 0.92 and was not intervened. 70% mid LAD stenosis 2 separate focal locations with IFR of 0.82 which was stented with YAJAIRA successfully with residual 0% stenosis and IFR of 0.92  100% proximal circumflex stenosis with left to left collaterals opacifying the small marginal branch. Procedure done via right radial artery without any complications. Visually RCA lesion appeared significant but IFR was normal and so it was not intervened. LAD lesion appeared borderline at best but had critical IFR and so drug-eluting stent was done in mid to distal LAD. Montse Nichole A single 30 mm stent covered both locations. Excellent angiographic results were obtained. Aggressive medical treatment and risk factor modification to continue.  ekg sinus rhythm with old inferior infarct age undetermined    ASSESSMENT and PLAN    Lipids: 8/22 LDL 67, HDL 40, ;    current treatment plan is effective, no change in therapy  cardiovascular risk and specific lipid/LDL goals reviewed  use of aspirin to prevent MI and TIA's discussed. Patient with recent inferior wall MI- s/p RCA stent- also has residual moderate to severe LAd stenosis-   Currently on optimal medical therapy with no significant angina or dyspnea. Will obtain lipid panel report from his PCP. Continue aspirin 81 mg indefinitely. Can proceed to planned endoscopy/colonoscopy with low cardiac risk. Okay to hold aspirin 5 to 7 days prior to procedure and resume it postop as soon as possible. 8/2022  Patient with history of CAD with RCA stent and moderate to severe LAD stenosis. Blood pressure is controlled with current medications.   Will obtain nuclear stress test to rule out ischemia. Recent lipid panel reviewed and discussed with patient LDL 67. Continue statin, aspirin, bb and amlodipine. 8/16/2022  Patient with history of CAD with stents to RCA. Seen in follow-up for stress test results. Nuclear stress test reviewed and discussed with patient this is abnormal with perfusion defect in the left circumflex or RCA territories. Will obtain echocardiogram to follow LV function. Cardiac cath reviewed and discussed with patient he would like to proceed. Patient to continue medical therapy with beta-blocker, statin aspirin and amlodipine. Follow-up postprocedure  THE PATIENT UNDERSTANDS THAT ALTHOUGH RARE, SEVERE  UNEXPECTED COMPLICATIONS CAN OCCUR WITH EACH TYPE OF CARDIAC CATH PROCEDURE. THESE RISKS INCLUDE,  BUT ARE NOT LIMITED TO: ALLERGIC REACTION, INFECTION, BLEEDING, BLOOD VESSEL INJURY,   KIDNEY INJURY FROM X-RAY DYE, PUNCTURE OF THE HEART/LUNGS,   EMERGENT OPEN HEART SURGERY, HEART ATTACK, STROKE, CARDIAC  ARREST OR DEATH OR NEED FOR EMERGENCY CARDIAC BYPASS SURGERY   9/2022  Patient with history of CAD status postcardiac cath with stent placed to LAD, 80% mid RCA stenosis which was not flow-limiting and had IFR of 0.92 and was not intervened. Recommend intense risk factor modification, continued DAPT with Brilinta and aspirin, continue beta-blocker high intensity statin. Blood pressure mildly elevated in office today will increase amlodipine to 10 mg/day and follow home BP chart. Recommended cardiac rehab however patient has declined. Prior echo reviewed EF 45-50%, recommend low sodium diet and weight loss efforts. Diagnoses and all orders for this visit:    1. Coronary artery disease involving native coronary artery of native heart without angina pectoris  -     isosorbide mononitrate ER (IMDUR) 30 mg tablet; Take 1 Tablet by mouth every morning.  Indications: prevention of anginal chest pain associated with coronary artery disease  -     clopidogreL (Plavix) 75 mg tab; Take 1 Tablet by mouth daily. 2. H/O right coronary artery stent placement    3. Localized edema  Comments:  1/23 Likely related to her knee sleeve as it is only on the left leg. Recommended to use compression stockings    4. Dyslipidemia, goal LDL below 70    5. Benign essential HTN    6. Obesity (BMI 30.0-34. 9)      Pertinent laboratory and test data reviewed and discussed with patient. See patient instructions also for other medical advice given    Medications Discontinued During This Encounter   Medication Reason    ticagrelor (BRILINTA) 90 mg tablet Alternate Therapy    ticagrelor (BRILINTA) 90 mg tablet Alternate Therapy    isosorbide mononitrate ER (IMDUR) 30 mg tablet REORDER       Follow-up and Dispositions    Return in about 6 months (around 7/27/2023), or if symptoms worsen or fail to improve, for with ekg.       1/27/2023 CAD is clinically stable since last PCI of LAD and remote PCI of RCA. Has left leg edema which is likely related to knee sleeve that he uses for arthritis. I recommended that he use a compression stockings below the knee. LDL is at goal.  Mild obesity and diet weight and exercise discussed in detail. Blood pressure is controlled and continue same medications.

## 2023-03-09 ENCOUNTER — HOSPITAL ENCOUNTER (OUTPATIENT)
Facility: HOSPITAL | Age: 68
Discharge: HOME | End: 2023-03-09
Payer: COMMERCIAL

## 2023-03-09 DIAGNOSIS — M25.562 LEFT KNEE PAIN, UNSPECIFIED CHRONICITY: ICD-10-CM

## 2023-03-09 PROCEDURE — 73562 X-RAY EXAM OF KNEE 3: CPT

## 2023-05-05 RX ORDER — ISOSORBIDE MONONITRATE 30 MG/1
TABLET, EXTENDED RELEASE ORAL
Qty: 30 TABLET | Refills: 5 | Status: SHIPPED | OUTPATIENT
Start: 2023-05-05

## 2023-07-14 ENCOUNTER — OFFICE VISIT (OUTPATIENT)
Age: 68
End: 2023-07-14
Payer: COMMERCIAL

## 2023-07-14 VITALS
WEIGHT: 223 LBS | BODY MASS INDEX: 33.03 KG/M2 | HEART RATE: 60 BPM | SYSTOLIC BLOOD PRESSURE: 177 MMHG | HEIGHT: 69 IN | DIASTOLIC BLOOD PRESSURE: 106 MMHG | OXYGEN SATURATION: 97 %

## 2023-07-14 DIAGNOSIS — I10 PRIMARY HYPERTENSION: ICD-10-CM

## 2023-07-14 DIAGNOSIS — Z95.5 HISTORY OF CORONARY ARTERY STENT PLACEMENT: ICD-10-CM

## 2023-07-14 DIAGNOSIS — E66.9 OBESITY (BMI 30.0-34.9): ICD-10-CM

## 2023-07-14 DIAGNOSIS — E78.5 DYSLIPIDEMIA, GOAL LDL BELOW 70: ICD-10-CM

## 2023-07-14 DIAGNOSIS — I25.10 ATHEROSCLEROSIS OF NATIVE CORONARY ARTERY OF NATIVE HEART WITHOUT ANGINA PECTORIS: Primary | ICD-10-CM

## 2023-07-14 PROCEDURE — 1123F ACP DISCUSS/DSCN MKR DOCD: CPT | Performed by: INTERNAL MEDICINE

## 2023-07-14 PROCEDURE — 93000 ELECTROCARDIOGRAM COMPLETE: CPT | Performed by: INTERNAL MEDICINE

## 2023-07-14 PROCEDURE — 3080F DIAST BP >= 90 MM HG: CPT | Performed by: INTERNAL MEDICINE

## 2023-07-14 PROCEDURE — 3077F SYST BP >= 140 MM HG: CPT | Performed by: INTERNAL MEDICINE

## 2023-07-14 PROCEDURE — 99214 OFFICE O/P EST MOD 30 MIN: CPT | Performed by: INTERNAL MEDICINE

## 2023-07-14 RX ORDER — VALSARTAN 80 MG/1
80 TABLET ORAL DAILY
Qty: 90 TABLET | Refills: 1 | Status: SHIPPED | OUTPATIENT
Start: 2023-07-14

## 2023-07-14 RX ORDER — FUROSEMIDE 20 MG/1
20 TABLET ORAL DAILY
COMMUNITY
Start: 2023-05-30

## 2023-07-14 RX ORDER — ROSUVASTATIN CALCIUM 40 MG/1
40 TABLET, COATED ORAL DAILY
COMMUNITY
Start: 2023-04-17

## 2023-07-14 ASSESSMENT — ENCOUNTER SYMPTOMS
GASTROINTESTINAL NEGATIVE: 1
EYES NEGATIVE: 1
RESPIRATORY NEGATIVE: 1

## 2023-07-14 NOTE — PROGRESS NOTES
Jeyson Stern presents today for   Chief Complaint   Patient presents with    Follow-up     6m with ekg       Heydi Arreguin III preferred language for health care discussion is english/other. Is someone accompanying this pt? no    Is the patient using any DME equipment during OV? no    Depression Screening:  Depression: Not at risk    PHQ-2 Score: 0     Fall Risk  Amb Fall Risk Assessment and TUG Test 8/24/2022   Fall in past 12 months? -   Able to walk? -   Total Score 1       Pt currently taking Anticoagulant therapy? ASA 81mg and Brilinta     Coordination of Care:  1. Have you been to the ER, urgent care clinic since your last visit? Hospitalized since your last visit? no    2. Have you seen or consulted any other health care providers outside of the [address] [place] [address] Avenue since your last visit? Include any pap smears or colon screening.  no

## 2023-07-14 NOTE — PROGRESS NOTES
Kassie Zepeda is a 79y.o. year old male. Follow-up of CAD, old PCI, hypertension, hyperlipidemia, obesity    8/2022  Patient presents due to recent elevated b/p and episodes of chest pressure with radiation to neck. He denies shortness of breath, palpitations or edema. 8/16/2022  Patient presents to follow-up for stress test results. 9/2022  Patient presents s/p cardiac catheterization with stent placed to LAD. He reports chest pain has resolved, he c/o shortness of breath, initially after stent was placed, which has improved. He denies palpitations or edema. 7/14/2023 denies chest pain shortness of breath. Occasional ankle edema bilaterally. No dizziness or palpitations. Review of Systems   Constitutional: Negative. HENT: Negative. Eyes: Negative. Respiratory: Negative. Cardiovascular:  Positive for leg swelling. Gastrointestinal: Negative. Endocrine: Negative. Genitourinary: Negative. Musculoskeletal: Negative. Neurological: Negative. Psychiatric/Behavioral: Negative. All other systems reviewed and are negative. Physical Exam  Vitals and nursing note reviewed. Constitutional:       Appearance: Normal appearance. HENT:      Head: Normocephalic and atraumatic. Nose: Nose normal.   Eyes:      Conjunctiva/sclera: Conjunctivae normal.   Cardiovascular:      Rate and Rhythm: Normal rate and regular rhythm. Pulses: Normal pulses. Heart sounds: Murmur heard. Harsh early systolic murmur is present with a grade of 2/6 at the upper right sternal border radiating to the neck. Pulmonary:      Effort: Pulmonary effort is normal.      Breath sounds: Normal breath sounds. Abdominal:      General: Bowel sounds are normal.      Palpations: Abdomen is soft. Musculoskeletal:         General: Normal range of motion. Right lower leg: No edema. Left lower leg: No edema. Skin:     General: Skin is warm and dry.    Neurological:

## 2023-07-26 RX ORDER — AMLODIPINE BESYLATE 10 MG/1
10 TABLET ORAL DAILY
Qty: 90 TABLET | Refills: 3 | Status: SHIPPED | OUTPATIENT
Start: 2023-07-26

## 2023-08-18 RX ORDER — CLOPIDOGREL BISULFATE 75 MG/1
TABLET ORAL
Qty: 30 TABLET | Refills: 1 | Status: SHIPPED | OUTPATIENT
Start: 2023-08-18

## 2023-10-10 DIAGNOSIS — E78.5 DYSLIPIDEMIA, GOAL LDL BELOW 70: ICD-10-CM

## 2023-10-10 DIAGNOSIS — I10 PRIMARY HYPERTENSION: ICD-10-CM

## 2023-10-27 DIAGNOSIS — I10 PRIMARY HYPERTENSION: ICD-10-CM

## 2023-10-27 RX ORDER — CLOPIDOGREL BISULFATE 75 MG/1
TABLET ORAL
Qty: 30 TABLET | Refills: 1 | Status: SHIPPED | OUTPATIENT
Start: 2023-10-27

## 2023-10-27 RX ORDER — VALSARTAN 80 MG/1
80 TABLET ORAL DAILY
Qty: 90 TABLET | Refills: 1 | Status: SHIPPED | OUTPATIENT
Start: 2023-10-27

## 2024-01-26 ENCOUNTER — OFFICE VISIT (OUTPATIENT)
Age: 69
End: 2024-01-26
Payer: COMMERCIAL

## 2024-01-26 VITALS
HEIGHT: 69 IN | HEART RATE: 66 BPM | SYSTOLIC BLOOD PRESSURE: 126 MMHG | WEIGHT: 221 LBS | BODY MASS INDEX: 32.73 KG/M2 | DIASTOLIC BLOOD PRESSURE: 74 MMHG | OXYGEN SATURATION: 97 %

## 2024-01-26 DIAGNOSIS — I25.10 ATHEROSCLEROSIS OF NATIVE CORONARY ARTERY OF NATIVE HEART WITHOUT ANGINA PECTORIS: Primary | ICD-10-CM

## 2024-01-26 DIAGNOSIS — E66.9 OBESITY (BMI 30.0-34.9): ICD-10-CM

## 2024-01-26 DIAGNOSIS — I10 PRIMARY HYPERTENSION: ICD-10-CM

## 2024-01-26 DIAGNOSIS — Z95.5 HISTORY OF CORONARY ARTERY STENT PLACEMENT: ICD-10-CM

## 2024-01-26 DIAGNOSIS — E78.5 DYSLIPIDEMIA, GOAL LDL BELOW 70: ICD-10-CM

## 2024-01-26 PROCEDURE — 99214 OFFICE O/P EST MOD 30 MIN: CPT | Performed by: INTERNAL MEDICINE

## 2024-01-26 PROCEDURE — 3078F DIAST BP <80 MM HG: CPT | Performed by: INTERNAL MEDICINE

## 2024-01-26 PROCEDURE — 3074F SYST BP LT 130 MM HG: CPT | Performed by: INTERNAL MEDICINE

## 2024-01-26 PROCEDURE — 1123F ACP DISCUSS/DSCN MKR DOCD: CPT | Performed by: INTERNAL MEDICINE

## 2024-01-26 ASSESSMENT — ENCOUNTER SYMPTOMS
EYES NEGATIVE: 1
GASTROINTESTINAL NEGATIVE: 1
RESPIRATORY NEGATIVE: 1

## 2024-01-26 NOTE — PROGRESS NOTES
Room 5    Patient brought medication list.    1. Have you been to the ER, urgent care clinic since your last visit?  Hospitalized since your last visit? No    2.  Where do you normally have your labs drawn?  PCP Office      3. Do you need any refills today? No      4. Which local pharmacy do you use?   [address]      5. Which mail order pharmacy do you use?   None       6. Are you here for surgical clearance? No, who will be doing your procedure/surgery (care team)?   N/A  
risk factor modification, continued DAPT with Brilinta and aspirin, continue beta-blocker high intensity statin.  Blood pressure mildly elevated in office today will increase amlodipine to 10 mg/day and follow home BP chart.  Recommended cardiac rehab however patient has declined. Prior echo reviewed EF 45-50%, recommend low sodium diet and weight loss efforts.      1/27/2023 CAD is clinically stable since last PCI of LAD and remote PCI of RCA.  Has left leg edema which is likely related to knee sleeve that he uses for arthritis.  I recommended that he use a compression stockings below the knee.  LDL is at goal.  Mild obesity and diet weight and exercise discussed in detail.  Blood pressure is controlled and continue same medications.    7/14/2023 CAD is clinically stable and no more symptoms of dyspnea since LAD stenting.  Brilinta can be discontinued and of 8/23.  His blood pressure is elevated.  Add valsartan and follow the home chart.  Labs ordered including lipids.  He has an upcoming appointment with PCP next month and they can be done at that time.  Weight loss recommended.  Mediterranean diet guidelines discussed and printed.    Sophia was seen today for follow-up.    Diagnoses and all orders for this visit:    Atherosclerosis of native coronary artery of native heart without angina pectoris    History of coronary artery stent placement    Primary hypertension    Obesity (BMI 30.0-34.9)    Dyslipidemia, goal LDL below 70  -     Lipid Panel; Future  -     Hepatic Function Panel; Future          See patient instructions also for other medical advice given    Medications Discontinued During This Encounter   Medication Reason    furosemide (LASIX) 20 MG tablet     clopidogrel (PLAVIX) 75 MG tablet Therapy completed       Follow-up and Dispositions    Return in about 1 year (around 1/26/2025), or if symptoms worsen or fail to improve, for with EKG, post test/procedure.           Return to ER if any significant CP

## 2024-02-12 DIAGNOSIS — I10 PRIMARY HYPERTENSION: ICD-10-CM

## 2024-02-12 RX ORDER — VALSARTAN 80 MG/1
80 TABLET ORAL DAILY
Qty: 90 TABLET | Refills: 3 | Status: SHIPPED | OUTPATIENT
Start: 2024-02-12

## 2024-02-12 NOTE — TELEPHONE ENCOUNTER
Requested Prescriptions     Pending Prescriptions Disp Refills    valsartan (DIOVAN) 80 MG tablet 90 tablet 3     Sig: Take 1 tablet by mouth daily

## 2025-02-03 ENCOUNTER — OFFICE VISIT (OUTPATIENT)
Age: 70
End: 2025-02-03
Payer: COMMERCIAL

## 2025-02-03 VITALS
DIASTOLIC BLOOD PRESSURE: 62 MMHG | HEART RATE: 67 BPM | BODY MASS INDEX: 32.44 KG/M2 | SYSTOLIC BLOOD PRESSURE: 112 MMHG | WEIGHT: 219 LBS | HEIGHT: 69 IN | OXYGEN SATURATION: 94 %

## 2025-02-03 DIAGNOSIS — Z95.5 S/P RIGHT CORONARY ARTERY (RCA) STENT PLACEMENT: ICD-10-CM

## 2025-02-03 DIAGNOSIS — I25.118 CORONARY ARTERY DISEASE OF NATIVE ARTERY OF NATIVE HEART WITH STABLE ANGINA PECTORIS (HCC): Primary | ICD-10-CM

## 2025-02-03 DIAGNOSIS — E78.2 MIXED HYPERLIPIDEMIA: ICD-10-CM

## 2025-02-03 DIAGNOSIS — I10 PRIMARY HYPERTENSION: ICD-10-CM

## 2025-02-03 PROCEDURE — 1123F ACP DISCUSS/DSCN MKR DOCD: CPT | Performed by: INTERNAL MEDICINE

## 2025-02-03 PROCEDURE — 93000 ELECTROCARDIOGRAM COMPLETE: CPT | Performed by: INTERNAL MEDICINE

## 2025-02-03 PROCEDURE — 3074F SYST BP LT 130 MM HG: CPT | Performed by: INTERNAL MEDICINE

## 2025-02-03 PROCEDURE — 99214 OFFICE O/P EST MOD 30 MIN: CPT | Performed by: INTERNAL MEDICINE

## 2025-02-03 PROCEDURE — 3078F DIAST BP <80 MM HG: CPT | Performed by: INTERNAL MEDICINE

## 2025-02-03 RX ORDER — ESOMEPRAZOLE MAGNESIUM 20 MG/1
20 GRANULE, DELAYED RELEASE ORAL DAILY
COMMUNITY

## 2025-02-03 ASSESSMENT — ENCOUNTER SYMPTOMS
GASTROINTESTINAL NEGATIVE: 1
RESPIRATORY NEGATIVE: 1
EYES NEGATIVE: 1

## 2025-02-03 NOTE — PROGRESS NOTES
Sophia Romero III is a 69 y.o. year old male.    Follow-up of CAD, old PCI, hypertension, hyperlipidemia, obesity    8/2022  Patient presents due to recent elevated b/p and episodes of chest pressure with radiation to neck.  He denies shortness of breath, palpitations or edema.   8/16/2022  Patient presents to follow-up for stress test results.  9/2022  Patient presents s/p cardiac catheterization with stent placed to LAD.  He reports chest pain has resolved, he c/o shortness of breath, initially after stent was placed, which has improved.  He denies palpitations or edema.  7/14/2023 denies chest pain shortness of breath.  Occasional ankle edema bilaterally.  No dizziness or palpitations.  1/26/2024 No significant chest pain, shortness of breath, edema, dizziness, palpitations or loss of consciousness.  Edema has resolved.  2/3/2025 No significant chest pain, shortness of breath, edema, dizziness, palpitations or loss of consciousness.            Review of Systems   Constitutional: Negative.    HENT: Negative.     Eyes: Negative.    Respiratory: Negative.     Cardiovascular: Negative.  Negative for leg swelling.   Gastrointestinal: Negative.    Endocrine: Negative.    Genitourinary: Negative.    Musculoskeletal: Negative.    Neurological: Negative.    Psychiatric/Behavioral: Negative.     All other systems reviewed and are negative.        Physical Exam  Vitals and nursing note reviewed.   Constitutional:       Appearance: Normal appearance.   HENT:      Head: Normocephalic and atraumatic.      Nose: Nose normal.   Eyes:      Conjunctiva/sclera: Conjunctivae normal.   Cardiovascular:      Rate and Rhythm: Normal rate and regular rhythm.      Pulses: Normal pulses.      Heart sounds: Murmur heard.      Harsh early systolic murmur is present with a grade of 2/6 at the upper right sternal border radiating to the neck.   Pulmonary:      Effort: Pulmonary effort is normal.      Breath sounds: Normal breath sounds.

## 2025-02-03 NOTE — PROGRESS NOTES
1. Have you been to the ER, urgent care clinic since your last visit?  Hospitalized since your last visit? NO    2.  Where do you normally have your labs drawn? [facility]    3. Do you need any refills today? NO    4. Which local pharmacy do you use (enter pharmacy)?   [facility]    5. Which mail order pharmacy do you use (enter pharmacy)?   N/A     6. Are you here for surgical clearance and if so who will be doing your     procedure/surgery (care team)?   NO

## (undated) DEVICE — CATH GUID COR EB35 6FR 100CM -- LAUNCHER

## (undated) DEVICE — SET FLD ADMIN 3 W STPCOCK FIX FEM L BOR 1IN

## (undated) DEVICE — CATH 5F 100CM JL35 -- DXTERITY

## (undated) DEVICE — RADIFOCUS OPTITORQUE ANGIOGRAPHIC CATHETER: Brand: OPTITORQUE

## (undated) DEVICE — GUIDEWIRE VASC L260CM DIA0035IN TIP L3MM PTFE J STD TAPR FIX

## (undated) DEVICE — PROCEDURE KIT FLUID MGMT 10 FR CUST MAINFOLD

## (undated) DEVICE — CATH GUID COR JR4.0 6FR 100CM -- LAUNCHER

## (undated) DEVICE — Device: Brand: OMNIWIRE PRESSURE GUIDE WIRE

## (undated) DEVICE — DEVICE INFL W ACCS + HEMSTAS VLV INSRT TOOL AND TORQ BASIX

## (undated) DEVICE — BAND HEMOSTAT DRY D-STAT RAD --

## (undated) DEVICE — CATH GUID COR EB40 6FR 100CM -- LAUNCHER

## (undated) DEVICE — CATH ANGI BLLN DIL 2.75X20MM -- NC EUPHORA

## (undated) DEVICE — TREK CORONARY DILATATION CATHETER 2.25 MM X 12 MM / RAPID-EXCHANGE: Brand: TREK

## (undated) DEVICE — PACK PROCEDURE SURG VASC CATH 161 MMC LF

## (undated) DEVICE — PRESSURE MONITORING SET: Brand: TRUWAVE

## (undated) DEVICE — GLIDESHEATH SLENDER STAINLESS STEEL KIT: Brand: GLIDESHEATH SLENDER

## (undated) DEVICE — COVER US PRB W15XL120CM W/ GEL RUBBERBAND TAPE STRP FLD GEN